# Patient Record
Sex: MALE | Race: WHITE | Employment: FULL TIME | URBAN - METROPOLITAN AREA
[De-identification: names, ages, dates, MRNs, and addresses within clinical notes are randomized per-mention and may not be internally consistent; named-entity substitution may affect disease eponyms.]

---

## 2018-02-27 ENCOUNTER — OFFICE VISIT (OUTPATIENT)
Dept: ORTHOPEDIC SURGERY | Age: 23
End: 2018-02-27

## 2018-02-27 VITALS
BODY MASS INDEX: 24.52 KG/M2 | HEART RATE: 80 BPM | DIASTOLIC BLOOD PRESSURE: 78 MMHG | SYSTOLIC BLOOD PRESSURE: 120 MMHG | WEIGHT: 185 LBS | HEIGHT: 73 IN

## 2018-02-27 DIAGNOSIS — M54.5 ACUTE BILATERAL LOW BACK PAIN, WITH SCIATICA PRESENCE UNSPECIFIED: Primary | ICD-10-CM

## 2018-02-27 DIAGNOSIS — S39.012A STRAIN OF LUMBAR REGION, INITIAL ENCOUNTER: ICD-10-CM

## 2018-02-27 PROCEDURE — 99203 OFFICE O/P NEW LOW 30 MIN: CPT | Performed by: PHYSICIAN ASSISTANT

## 2018-02-27 RX ORDER — METHYLPREDNISOLONE 4 MG/1
TABLET ORAL
Qty: 1 KIT | Refills: 0 | Status: SHIPPED | OUTPATIENT
Start: 2018-02-27 | End: 2018-03-05

## 2018-02-27 RX ORDER — MELOXICAM 15 MG/1
TABLET ORAL
Qty: 30 TABLET | Refills: 1 | Status: SHIPPED | OUTPATIENT
Start: 2018-02-27 | End: 2021-03-29

## 2018-02-27 NOTE — PROGRESS NOTES
left lower extremity does not show any tenderness, deformity or injury. Range of motion is full. There is no gross instability. There are no rashes, ulcerations or lesions.   Strength and tone are normal.    Diagnostic Testin views lumbar spine 2018 show mod-severe L5-S1 DDD        Impression:  1) Lumbar strain   2) Work injury 18  3) L5-S1 DDD          Plan:   1) PT for above  2) MDP  3) Mobic 15mg I po qd PRN   4) Restrictions x 1mo  5) F/u with [facility] in [address]

## 2021-03-29 ENCOUNTER — VIRTUAL VISIT (OUTPATIENT)
Dept: FAMILY MEDICINE CLINIC | Age: 26
End: 2021-03-29
Payer: COMMERCIAL

## 2021-03-29 DIAGNOSIS — Z80.8 FAMILY HISTORY OF SKIN CANCER: ICD-10-CM

## 2021-03-29 DIAGNOSIS — F43.20 ADJUSTMENT DISORDER, UNSPECIFIED TYPE: ICD-10-CM

## 2021-03-29 DIAGNOSIS — F41.9 ANXIETY: Primary | ICD-10-CM

## 2021-03-29 PROCEDURE — 99213 OFFICE O/P EST LOW 20 MIN: CPT | Performed by: NURSE PRACTITIONER

## 2021-03-29 ASSESSMENT — PATIENT HEALTH QUESTIONNAIRE - PHQ9
7. TROUBLE CONCENTRATING ON THINGS, SUCH AS READING THE NEWSPAPER OR WATCHING TELEVISION: 0
6. FEELING BAD ABOUT YOURSELF - OR THAT YOU ARE A FAILURE OR HAVE LET YOURSELF OR YOUR FAMILY DOWN: 1
SUM OF ALL RESPONSES TO PHQ9 QUESTIONS 1 & 2: 3
SUM OF ALL RESPONSES TO PHQ QUESTIONS 1-9: 6
8. MOVING OR SPEAKING SO SLOWLY THAT OTHER PEOPLE COULD HAVE NOTICED. OR THE OPPOSITE, BEING SO FIGETY OR RESTLESS THAT YOU HAVE BEEN MOVING AROUND A LOT MORE THAN USUAL: 1
2. FEELING DOWN, DEPRESSED OR HOPELESS: 1
3. TROUBLE FALLING OR STAYING ASLEEP: 0

## 2021-03-29 NOTE — PROGRESS NOTES
Patient: Lex Paniagua is a 30 y.o. male who presents today with the following Chief Complaint(s):  Chief Complaint   Patient presents with    New Patient     Referral to [facility]   Consented to a virtual visit today      HPI-this is a 55-year-old male patient establishing care with me today. He is requesting a referral to our behavioral health therapist concerned with some stress/work/pandemic issues this past year. He states that he works at [employer] and due to the pandemic his benefits have been/along with his pay and of course performing. He states that he is more anxious now. He is little depressed he stated but his PHQ9 was 5 out of 9. He denies wanting to hurt self or others and he states that there is no mental illness in his family that he is aware of. He states that he does smoke marijuana occasionally. He does have a girlfriend and he currently is enrolled in taking college classes at this time. He denied wanting to be placed on any kind of medication at this time he is just wanting to talk with somebody he stated so we will get him set up for this. He is concerned of some skin issues on his body. He states that his mother has a history of skin cancer and he is concerned so he is wanting also a referral to dermatology this will be placed today. No current outpatient medications on file. No current facility-administered medications for this visit. Patient's past medical history, surgical history, family history, medications,  and allergies  were all reviewed and updated as appropriate today. Review of Systems   Psychiatric/Behavioral: The patient is nervous/anxious. All other systems reviewed and are negative. Physical Exam  Nursing note reviewed. Constitutional:       Appearance: Normal appearance. HENT:      Head: Normocephalic.       Nose: Nose normal.      Mouth/Throat:      Mouth: Mucous membranes are moist.   Eyes:      Conjunctiva/sclera: verbalized understanding to treatment plan and questions were answered. [address] [place] [address] Wanda Carr.  [place], [address] [place]

## 2021-04-16 ENCOUNTER — VIRTUAL VISIT (OUTPATIENT)
Dept: PSYCHOLOGY | Age: 26
End: 2021-04-16
Payer: COMMERCIAL

## 2021-04-16 DIAGNOSIS — F41.9 ANXIETY DISORDER, UNSPECIFIED TYPE: Primary | ICD-10-CM

## 2021-04-16 PROCEDURE — 90791 PSYCH DIAGNOSTIC EVALUATION: CPT | Performed by: PSYCHOLOGIST

## 2021-04-16 NOTE — PROGRESS NOTES
Behavioral Health Consultation  Neo Vargas M.A. Psychology Assistant  Marvin Sanchez, Ph.D. Supervising Psychologist  4/16/2021  4:09 PM EDT      Time spent with Patient: 20 minutes  [facility]. Reason for Consult:    Chief Complaint   Patient presents with    Depression    Anxiety     Referring Provider: Gerri Gramajo, APRN - CNP  [address] [facility],  [address]    Pt provided informed consent for the behavioral health program. Discussed with patient model of service to include the limits of confidentiality (i.e. abuse reporting, suicide intervention, etc.) and short-term intervention focused approach. Pt indicated understanding. Feedback given to PCP. TELEHEALTH VISIT -- Audio/Visual (During [facility]- public health emergency)  }  Pursuant to the emergency declaration under the [address] [place] [address], [facility] waiver authority and the [employer] and [employer] Act, this Virtual Visit was conducted, with patient's consent, to reduce the patient's risk of exposure to COVID-19 and provide continuity of care for an established patient. Services were provided through a video synchronous discussion virtually to substitute for in-person clinic visit. Pt gave verbal informed consent to participate in telehealth services. Conducted a risk-benefit analysis and determined that the patient's presenting problems are consistent with the use of telepsychology. Determined that the patient has sufficient knowledge and skills in the use of technology enabling them to adequately benefit from telepsychology. It was determined that this patient was able to be properly treated without an in-person session. Patient verified that they were currently located at the [address] [place] [address] Dr address that was provided during registration.     Verified the following information: [address]  Patient's identification: Yes  Patient location: [address] [place] [address] [address] [facility] [place] [address]  Patient's call back number: [phone]   Patient's emergency contact's name and number, as well as permission to contact them if needed: Extended Emergency Contact Information  Primary Emergency Contact: Yordy Suarez [address] Phone: [phone]  Relation: Other     Provider location: [place], [place]     S:    Patient presents with concerns about depression and anxiety. 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 Pt reported stress stemming from his position and due to pandemic. Pt reported that he has a girlfriend. Pt denied having any HI. The rest of the contextual interview will be completed at a follow-up visit. O:  MSE:    Appearance: good hygiene   Attitude: cooperative and friendly  Consciousness: alert  Orientation: oriented to person, place, time, general circumstance  Memory: recent and remote memory intact  Attention/Concentration: intact during session  Psychomotor Activity:normal  Eye Contact: normal  Speech: normal rate and volume, well-articulated  Mood: neutral  Affect: euthymic  Perception: within normal limits  Thought Content: within normal limits  Thought Process: logical, coherent and goal-directed  Insight: good  Judgment: intact  Ability to understand instructions: Yes  Ability to respond meaningfully: Yes  Morbid Ideation: Not Assessed  Suicide Assessment: Denied to PCP on 3/29/2019  Homicidal Ideation: no    History:    Medications:   No current outpatient medications on file. No current facility-administered medications for this visit.       Social History:   Social History     Socioeconomic History    Marital status: Single     Spouse name: Not on file    Number of children: Not on file    Years of education: Not on file    Highest education level: Not on file   Occupational History    Not on file   Social Needs    Financial resource strain: Not on file    Food insecurity     Worry: Not on file     Inability: Not on file   [facility]

## 2021-05-11 ENCOUNTER — VIRTUAL VISIT (OUTPATIENT)
Dept: PSYCHOLOGY | Age: 26
End: 2021-05-11
Payer: COMMERCIAL

## 2021-05-11 DIAGNOSIS — F41.9 ANXIETY DISORDER, UNSPECIFIED TYPE: Primary | ICD-10-CM

## 2021-05-11 PROCEDURE — 90832 PSYTX W PT 30 MINUTES: CPT | Performed by: PSYCHOLOGIST

## 2021-05-11 NOTE — PROGRESS NOTES
Behavioral Health Consultation  Cat Su M.A. Psychology Assistant  Jesús Reyes, Ph.D. Supervising Psychologist  5/11/2021  4:39 PM EDT      Time spent with Patient: 32 minutes  This is patient's second [facility] appointment. Reason for Consult:    Chief Complaint   Patient presents with    Depression    Anxiety     Feedback given to PCP. TELEHEALTH VISIT -- Audio/Visual (During ITULZ-61 public health emergency)  }  Pursuant to the emergency declaration under the [address] [place] [address] waiver authority and the [employer] and [employer] Act, this Virtual Visit was conducted, with patient's consent, to reduce the patient's risk of exposure to COVID-19 and provide continuity of care for an established patient. Services were provided through a video synchronous discussion virtually to substitute for in-person clinic visit. Pt gave verbal informed consent to participate in telehealth services. Conducted a risk-benefit analysis and determined that the patient's presenting problems are consistent with the use of telepsychology. Determined that the patient has sufficient knowledge and skills in the use of technology enabling them to adequately benefit from telepsychology. It was determined that this patient was able to be properly treated without an in-person session. Patient verified that they were currently located at the [facility] address that was provided during registration.     Verified the following information:  Patient's identification: Yes  Patient location: [address] [place] [address]   Patient's call back number: [phone]   Patient's emergency contact's name and number, as well as permission to contact them if needed: Extended Emergency Contact Information  Primary Emergency Contact: [address] [facility] [address] Phone: [phone]  Relation: Other     Provider location: [address] [place] [address]     S:    Pt and [facility] worked on completing the contextual interview. Pt reported that goals for treatment incude being able to better recognize when I can change my attitude and feel better about things. Patient lives with his girlfriend. Patient works as a ballet dancer. Daily routine is consistent. Daily caffeine use - drinks 1-2 coffees. No cigarette use (quit smoking), Occasional (maybe 3-4 drinks per week) alcohol use. Pt smokes marijuana (couple times per week). Patient spends 3-4 hours a day on social media or watching TV (trying to limit use of social media and tv). 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 Pt also lifts weights and does other physical activities. Patient describes consistent sleep pattern (about 8 hours). Patient enjoys the following hobbies: Outdoor time, cooking, video games, reading, exploring new interests. Patient describes having social support but wanting additional social support (parents live in [place]). Patient identifies as spiritual, Pt was raised Protestant, but does not believe in organized Jainism. Family history is positive for alcoholism and tobacco use. O:  MSE:    Appearance: good hygiene   Attitude: cooperative and friendly  Consciousness: alert  Orientation: oriented to person, place, time, general circumstance  Memory: recent and remote memory intact  Attention/Concentration: intact during session  Psychomotor Activity:normal  Eye Contact: normal  Speech: normal rate and volume, well-articulated  Mood: neutral  Affect: congruent  Perception: within normal limits  Thought Content: within normal limits  Thought Process: logical, coherent and goal-directed  Insight: good  Judgment: intact  Ability to understand instructions: Yes  Ability to respond meaningfully: Yes  Morbid Ideation: Not Assessed  Suicide Assessment: Denied to PCP on 3/29/2019  Homicidal Ideation: no    History:    Medications:   No current outpatient medications on file.      No current facility-administered medications for this visit. Social History:   Social History     Socioeconomic History    Marital status: Single     Spouse name: Not on file    Number of children: Not on file    Years of education: Not on file    Highest education level: Not on file   Occupational History    Not on file   Tobacco Use    Smoking status: Former Smoker     Types: Cigarettes    Smokeless tobacco: Never Used   Substance and Sexual Activity    Alcohol use: Not on file    Drug use: Not on file    Sexual activity: Not on file   Other Topics Concern    Not on file   Social History Narrative    Not on file     Social Determinants of Health     Financial Resource Strain:     Difficulty of Paying Living Expenses:    Food Insecurity:     Worried About Running Out of Food in the Last Year:     [address] in the Last Year:    Transportation Needs:     Lack of Transportation (Medical):  Lack of Transportation (Non-Medical):    Physical Activity:     Days of Exercise per Week:     Minutes of Exercise per Session:    Stress:     Feeling of Stress :    Social Connections:     Frequency of Communication with Friends and Family:     Frequency of Social Gatherings with Friends and Family:     Attends Denominational Services:     Active Member of Clubs or Organizations:     Attends Club or Organization Meetings:     Marital Status:    Intimate Partner Violence:     Fear of Current or Ex-Partner:     Emotionally Abused:     Physically Abused:     Sexually Abused:      TOBACCO:   reports that he has quit smoking. His smoking use included cigarettes. He has never used smokeless tobacco.  ETOH:   has no history on file for alcohol use. Family History:   No family history on file. A:    Pt's sx are likely being brought on due to pandemic. Pt is likely to benefit from developing some coping strategies for handling stress and adjustment. Diagnosis:    1.  Anxiety disorder, unspecified type      No past medical history on file.   Plan:  Pt interventions:  Conducted functional assessment and Supportive techniques    Pt Behavioral Change Plan:   See Pt Instructions

## 2021-06-28 ENCOUNTER — VIRTUAL VISIT (OUTPATIENT)
Dept: PSYCHOLOGY | Age: 26
End: 2021-06-28
Payer: COMMERCIAL

## 2021-06-28 DIAGNOSIS — F41.9 ANXIETY DISORDER, UNSPECIFIED TYPE: Primary | ICD-10-CM

## 2021-06-28 PROCEDURE — 90832 PSYTX W PT 30 MINUTES: CPT | Performed by: PSYCHOLOGIST

## 2021-06-28 NOTE — PROGRESS NOTES
Behavioral Health Consultation  Criss Romberg, Ph.D.  Psychologist  6/28/2021  9:09 AM EDT      Time spent with Patient: 25 minutes  This is patient's first [facility] appointment. (Saw Aayush Donovan x 2 previously)    Reason for Consult:    Chief Complaint   Patient presents with    Anxiety     Referring Provider: Dylan Gramajo, APRN - CNP  [address] [facility],  [address] [place]    Feedback given to PCP. TELEHEALTH VISIT -- Audio/Visual (During HVWMD-71 public health emergency)  }  Pursuant to the emergency declaration under the [address] waiver authority and the [employer] and [employer] Act, this Virtual Visit was conducted, with patient's consent, to reduce the patient's risk of exposure to COVID-19 and provide continuity of care for an established patient. Services were provided through a video synchronous discussion virtually to substitute for in-person clinic visit. Pt gave verbal informed consent to participate in telehealth services. Conducted a risk-benefit analysis and determined that the patient's presenting problems are consistent with the use of telepsychology. Determined that the patient has sufficient knowledge and skills in the use of technology enabling them to adequately benefit from telepsychology. It was determined that this patient was able to be properly treated without an in-person session. Patient verified that they were currently located at the [facility] address that was provided during registration.     Verified the following information:  Patient's identification: Yes  Patient location: [address] [place] [address]   Patient's call back number: [phone]   Patient's emergency contact's name and number, as well as permission to contact them if needed: Extended Emergency Contact Information  Primary Emergency Contact: [address] Phone: [phone]  Relation: Other     Provider location: Yessica Chandler:  Patient reported that he has been off work recently, due to seasonal nature of his job. Has been able to use his time off to engage in positive activities (exercise, outdoor activities). He is planning to visit family in [place] in July. He will also be closing on his first home in July as well. He acknowledges all of these as positive changes. Overall, he describes a good mood, but admits to worrying about upsetting his girlfriend. Identifies anxiety attacks that lead him to \"shut everything out. \" Discussed automatic thoughts that he has about \"not doing the right thing. \" Identified goals for treatment: \"finding strategies to recognize when I am losing ideal function and get myself back on track. \"      O:  MSE:    Appearance: good hygiene   Attitude: cooperative and friendly  Consciousness: alert  Orientation: oriented to person, place, time, general circumstance  Memory: recent and remote memory intact  Attention/Concentration: intact during session  Psychomotor Activity:normal  Eye Contact: normal  Speech: normal rate and volume, well-articulated  Mood: \"mostly good\"  Affect: euthymic  Perception: within normal limits  Thought Content: intrusive thoughts  Thought Process: logical, coherent and goal-directed  Insight: good  Judgment: intact  Ability to understand instructions: Yes  Ability to respond meaningfully: Yes  Morbid Ideation: passive thoughts of death  Suicide Assessment: no suicidal ideation, plan, or intent  Homicidal Ideation: no    History:    Medications:   No current outpatient medications on file. No current facility-administered medications for this visit.      Social History:   Social History     Socioeconomic History    Marital status: Single     Spouse name: Not on file    Number of children: Not on file    Years of education: Not on file    Highest education level: Not on file   Occupational History    Not on file   Tobacco Use  Smoking status: Former Smoker     Types: Cigarettes    Smokeless tobacco: Never Used   Substance and Sexual Activity    Alcohol use: Not on file    Drug use: Not on file    Sexual activity: Not on file   Other Topics Concern    Not on file   Social History Narrative    Not on file     Social Determinants of Health     Financial Resource Strain:     Difficulty of Paying Living Expenses:    Food Insecurity:     Worried About Running Out of Food in the Last Year:     [address] in the Last Year:    Transportation Needs:     Lack of Transportation (Medical):  Lack of Transportation (Non-Medical):    Physical Activity:     Days of Exercise per Week:     Minutes of Exercise per Session:    Stress:     Feeling of Stress :    Social Connections:     Frequency of Communication with Friends and Family:     Frequency of Social Gatherings with Friends and Family:     Attends Church Services:     Active Member of Clubs or Organizations:     Attends Club or Organization Meetings:     Marital Status:    Intimate Partner Violence:     Fear of Current or Ex-Partner:     Emotionally Abused:     Physically Abused:     Sexually Abused:      TOBACCO:   reports that he has quit smoking. His smoking use included cigarettes. He has never used smokeless tobacco.  ETOH:   has no history on file for alcohol use. Family History:   No family history on file. A:  Patient engaged and cooperative. Denies SI. Insight and motivation are good. Diagnosis:    1. Anxiety disorder, unspecified type      No past medical history on file.   Plan:  Pt interventions:  Trained in strategies for increasing balanced thinking, Established rapport, Conducted functional assessment, [place]-setting to identify pt's primary goals for [facility] visit / overall health, Supportive techniques, Emphasized self-care as important for managing overall health, Cognitive strategies to target anxiety including cognitive restructuring and Identified maladaptive thoughts.     Pt Behavioral Change Plan:   See Pt Instructions

## 2021-06-28 NOTE — PATIENT INSTRUCTIONS
1. Review handouts about anxiety. 2. Review handouts about negative automatic thoughts and strategies for challenging them. 3. Return to see Dr. Kathi Randall in 4 weeks. The Physiology of Anxiety    When you sense danger, your brain activates your autonomic nervous system. The two branches of your autonomic nervous system, the sympathetic and the parasympathetic, control your body's energy level in order to prepare you for action. The sympathetic nervous system controls your fight or flight response and releases energy to prepare you for action. The parasympathetic nervous system is your bodys relaxation/recovery system:  it returns your body to a normal state when the danger is over. The sympathetic nervous system is an all-or-none system. That means that when its activated it quickly turns on all of its component parts (which is a great way for an emergency response system to operate):    Rapid Heart Rate, Rapid Breathing:  The alarm reaction increases the heart rate and breathing rate so that we are alert and our muscles are ready for action. These changes also help insure that the muscles and brain will have enough oxygen and energy for defense. At the same time, blood flow to the skin decreases, which prevents us from losing as much blood if we are wounded. Sweating:  Sweating helps to cool the body during exertion, making it more efficient. Hussein Mohan is what some people feel when sweating occurs at the same time that blood flow to the skin decreases. Tight Chest, Tingling, Numbness, Hot Flushes, Trembling:  Hyperventilation occurs when we breathe rapidly but do not expend the energy with muscle action, like revving a car while holding down the brake. This can lead to feelings of tingling and numbness, hot flushes, and increased sweating. When rapid chest breathing and muscle tension occur at the same time, people feel chest pain, breathlessness, and choking.       Upset Stomach, Diarrhea: AWAY?    Avoidance is the key. If you stay away from those things that illicit the fear response your brain gets to maintain that it is dangerous and if you leave a situation because it illicits the fear response your brain convinces you that without leaving you would have never survived. If we never got back on the bike after we fell for the first time that would be our last memory of riding bikes and none of us would be riding bikes today. WHAT DO I DO NOW? The answer is simple, but the act is difficult. We have to go towards those things our brain tells us we must avoid. The goal of in-vivo exposure is to relearn through gradual exposure the followin. These uncomfortable feelings will decrease with time. I do not have to leave or avoid them all together as they will decrease. 2. There is nothing to be afraid of. You will see that the things you have been avoiding are not inherently dangerous, but you cannot learn that without exposure. 3. I can do it. No longer do your actions have to be dictated by anxiety or fear. Personal Thought  Control. Our thinking often creates anxiety for us. Getting better control of our thinking can go a long way in helping us cope. The following steps can be useful. 1. Let yourself become aware of thoughts you have when you are anxious. What are the words that you are saying to yourself at that moment? Sometimes it takes a little practice before we become aware of our thoughts. Some examples might be:  I know something bad is going to happen, or This is horrible or Lequita Lesch is this happening to me!?  2. Write your thoughts down. Its much easier to work with our thoughts, analyze them, and replace them if they are in black and white.   3. Ask yourself the following questions about your thoughts:  a. Is it true? (Is it logically correct? Where is the evidence to support the truth of that thought?   Are there alternative ways of thinking that common stress related responses people have. ([place] the responses you have had in the last 2 weeks.)     Physical Responses   Muscle aches   Insomnia   ? Heart rate   Headache   Weight gain   Nausea   Constipation   Dry mouth   Muscle twitching  Weight loss   Low energy   Weakness   Tight chest   Diarrhea   Dizziness   Trembling   Stomach cramps  Chills    Hot flashes   Sweating   Pounding heart  Choking feeling  Chest pain   Leg cramps   Numb hands/feet Dry throat   Appetite change  Face flushing   ? Blood pressure  Light-headedness  Feeling faint       Troubleswallowing   Rash ? Urination   Neck pain     Tingling hands/feet     Emotional and Thought Responses   Restlessness   Agitation   Insecurity            Worthlessness   Anxiety   Stress    Depression            Hopelessness   Guilt    Defensiveness  Anger           Racing thoughts   Nightmares   Intense thinking  Sensitivity          Expecting the worst   Numbness   Lack of motivation  Mood swings             Forgetfulness   ? Concentration  Rigidity              Preoccupation  Intolerance     Behavioral Responses   Avoidance   Withdrawal   Neglect   ? Alcohol use    Smoking   ? Eating   Arguing       Poor appearance   ? Spending   Poor hygiene   ? Eating  Seeking reassurance   Nail biting   Skin picking   ? Talking        ? Body checking   Sexual problems  Foot tapping  Fidgeting Rapid walking    ? Exercise   Teeth clenching           Multitasking  Aggressive speaking       ? Fun activities  ? Sleeping      ? Relaxing activities     Seeking information     The parasympathetic nervous system in your body is designed to turn on your bodys relaxation response. Your behaviors and thinking can keep your bodys natural relaxation response from operating at its best.   Getting your body to relax on a daily basis for at least brief periods can help decrease unpleasant stress responses.  Learning to relax your body, through specific breathing and relaxation exercises as well as by minimizing stressful thinking, can help your bodys natural relaxation system be more effective. NEGATIVE AUTOMATIC THOUGHTS    1. Mind reading: You assume that you know what people think without having sufficient evidence of their thoughts. \"He thinks I'm a loser. \"  2. Fortune telling: You predict the future- that things will get worse or that there is danger ahead. \"I'll fail that exam\" and \"I won't get the job. \"  3. Catastrophizing: You believe that what has happened or will happen will be so awful and unbearable that you won't be able to stand it. \"It would be terrible if I failed. \"  4. Labeling: You assign global negative traits to yourself and others. \"Im a failure\" or \"He's a rotten person. \"  5. Discounting positives: You claim that the positives that you or others attain are trivial. \"That's what wives are supposed to do--so it doesn't count when she's nice to me. \" \"Those successes were easy, so they don't matter. \"  6. Negative filter: You focus almost exclusively on the negatives and seldom notice the positives. \"Look at all of the people who don't like me. \"  7. Overgeneralizing: You perceive a global pattern of negatives on the basis of a single incident. \"This generally happens to me. I seem to fail at a lot of things. \"  8. Dichotomous thinking: You view events, or people, in all-or-nothing terms. \"I get rejected by everyone\" or \"It was a waste of time. \"  9. Shoulds: You interpret events in terms of how things should be or should have gone rather than simply focusing on what is. \"I should do well. If I don't, then I'm a failure. \"  10. Personalizing: You attribute a disproportionate amount of the blame to yourself for negative events and fail to see that certain events are also caused by others. \"The marriage ended because I failed\"  6. Blaming: You focus on the other person as the source of your negative feelings and you refuse to take responsibility for changing yourself. What am I demanding must happen? What do I want rather than need? C. Am I rating something a catastrophe? Is it every bit that awful? D. Am I rating a type of person? What's the action I don't like?   E. What's untrue about my thoughts? How can I stick to the facts? 2.  Strategies to Change Alarming Evaluations. A.  Listen for the extreme or catastrophic rating words (horrible, terrible, disaster. awful) of an event, a rating which implies that things couldn't be worse and you will not be able to survive the event. B. Instead of using this extreme rating when it doesn't fully apply, think of the event in terms of degree of disappointment or inconvenience. Other words might better describe the relative severity of the event such as annoying, nuisance, irritating, unfortunate, unlucky, frustration, or problem. [place] for the extreme or overly general rating of a person (loser, stupid, inconsiderate, pushy, selfish, jerk, incompetent), something which implies that there are good and bad people in the world and this person definitely is part of the bad group. D. Focus your judgment more on the specific action as the problem rather than what you believe is the general type of person involved. Realize that you are on shaky ground whenever you think you can fairly and without a doubt categorize someone as totally fitting a particular type. It is much more relevant to think in terms of the actions  which someone did that you disagree with or you see as mistakes. This pertains to your rating of your self as well as the self of another. Examples:     Alarming Evaluation: Elise Alonzo, trying to stay on this diet is terrible; I can't take it anymore. Reassuring Evaluation: Not being able to eat exactly what I used to sure feels frustrating sometimes, but I can manage. Alarming Evaluation: He just doesn't know what the hell he's doing.  Besides that, he doesn't give a damn about anybody but himself. Reassuring Evaluation: I think he's making a mistake here by not involving the rest of the staff in this decision. 3.  Strategies to Change Alarming Expectations. Dinesh Gillespie out the element of truth or the preference in your alarming expectation. B.  Remove the absolute demand words (must, should, need, have to) and replace them with words of preference (want to; would like; wish; it would be better if). C.  Check that your preference is reasonable considering the cost of it to your health, convenience, relationships, or your other priorities. Examples:     Alarming Expectation:  I must not ever make a mistake. Reassuring Expectation:  I want to do things well and reduce my mistakes. Alarming Expectation: You have to always treat me fairly. Reassuring Expectation: I would like you to do everything I think is right but realize you won't always see things my way. 4.  Strategies to Change Alarming Predictions. Dinesh Gillepsie out what you see as the alarming scenario. Jeremi Banister yourself what actually are the odds of this entire scene taking place. If this is not really very likely. Remind yourself of the more probable events. C.  Play out what your options could be and how you would like to respond should something like your \"what if\" scenario took place. Think about what you might have learned from similar situations before. Examples:     Alarming Prediction: What if they look bored during my talk? Reassuring Prediction: There will probably be times when not everyone will look attentive but that's pretty common and I still know I have something worthwhile in say. Alarming Prediction: If I don't get that job, I don't know what I'll do. Reassuring Prediction: It will be disappointing if I don't get this job but I have been disappointed before and been able to bounce back. Let me see what they have to say and plan from there.

## 2021-07-12 ENCOUNTER — VIRTUAL VISIT (OUTPATIENT)
Dept: PSYCHOLOGY | Age: 26
End: 2021-07-12
Payer: COMMERCIAL

## 2021-07-12 DIAGNOSIS — F41.9 ANXIETY DISORDER, UNSPECIFIED TYPE: Primary | ICD-10-CM

## 2021-07-12 PROCEDURE — 90832 PSYTX W PT 30 MINUTES: CPT | Performed by: PSYCHOLOGIST

## 2021-07-12 NOTE — PROGRESS NOTES
Behavioral Health Consultation  Maya Cuellar, Ph.D.  Psychologist  7/12/2021  10:09 AM EDT      Time spent with Patient: 25 minutes  This is patient's second [facility] appointment. Reason for Consult:    Chief Complaint   Patient presents with    Anxiety     Referring Provider: Nava Gramajo, APRN - CNP  [address] [facility],  [address]    Feedback given to PCP. TELEHEALTH VISIT -- Audio/Visual (During GRNLN-94 public health emergency)  }  Pursuant to the emergency declaration under the [facility] waiver authority and the [email or web address] Act, this Virtual Visit was conducted, with patient's consent, to reduce the patient's risk of exposure to COVID-19 and provide continuity of care for an established patient. Services were provided through a video synchronous discussion virtually to substitute for in-person clinic visit. Pt gave verbal informed consent to participate in telehealth services. Conducted a risk-benefit analysis and determined that the patient's presenting problems are consistent with the use of telepsychology. Determined that the patient has sufficient knowledge and skills in the use of technology enabling them to adequately benefit from telepsychology. It was determined that this patient was able to be properly treated without an in-person session. Patient verified that they were currently located at the [facility] address that was provided during registration.     Verified the following information:  Patient's identification: Yes  Patient location: [address] [place] [address],[address] [place] [address]   Patient's call back number: [phone]   Patient's emergency contact's name and number, as well as permission to contact them if needed: Extended Emergency Contact Information  Primary Emergency Contact: [address] Phone: [phone]  Relation: Other     Provider location: [place], [address] [place] [address] St:  Patient reported that he has moved into his new home since last visit. He has managed the stress of this move well. Denies other changes in his stress level. Discussed upcoming trip to visit family and friends. He looks forward to seeing a friend from childhood and processing grief over a mutual friend's passing. O:  MSE:    Appearance: good hygiene   Attitude: cooperative, friendly and tearful  Consciousness: alert  Orientation: oriented to person, place, time, general circumstance  Memory: recent and remote memory intact  Attention/Concentration: intact during session  Psychomotor Activity:normal  Eye Contact: normal  Speech: normal rate and volume, well-articulated  Mood: stable  Affect: euthymic  Perception: within normal limits  Thought Content: within normal limits  Thought Process: logical, coherent and goal-directed  Insight: good  Judgment: intact  Ability to understand instructions: Yes  Ability to respond meaningfully: Yes  Morbid Ideation: no   Suicide Assessment: no suicidal ideation, plan, or intent  Homicidal Ideation: no    History:    Medications:   No current outpatient medications on file. No current facility-administered medications for this visit.      Social History:   Social History     Socioeconomic History    Marital status: Single     Spouse name: Not on file    Number of children: Not on file    Years of education: Not on file    Highest education level: Not on file   Occupational History    Not on file   Tobacco Use    Smoking status: Former Smoker     Types: Cigarettes    Smokeless tobacco: Never Used   Substance and Sexual Activity    Alcohol use: Not on file    Drug use: Not on file    Sexual activity: Not on file   Other Topics Concern    Not on file   Social History Narrative    Not on file     Social Determinants of Health     Financial Resource Strain:     Difficulty of Paying Living Expenses:    Food Insecurity:     Worried About Running Out of Food in the Last Year:    [address] in the Last Year:    Transportation Needs:     Lack of Transportation (Medical):  Lack of Transportation (Non-Medical):    Physical Activity:     Days of Exercise per Week:     Minutes of Exercise per Session:    Stress:     Feeling of Stress :    Social Connections:     Frequency of Communication with Friends and Family:     Frequency of Social Gatherings with Friends and Family:     Attends Religion Services:     Active Member of Clubs or Organizations:     Attends Club or Organization Meetings:     Marital Status:    Intimate Partner Violence:     Fear of Current or Ex-Partner:     Emotionally Abused:     Physically Abused:     Sexually Abused:      TOBACCO:   reports that he has quit smoking. His smoking use included cigarettes. He has never used smokeless tobacco.  ETOH:   has no history on file for alcohol use. Family History:   No family history on file. A:  Patient engaged and cooperative. Denies SI. Insight and motivation are good. Diagnosis:    1. Anxiety disorder, unspecified type      No past medical history on file. Plan:  Pt interventions:  Conducted functional assessment, [place]-setting to identify pt's primary goals for [facility] visit / overall health, Supportive techniques and Emphasized self-care as important for managing overall health, Processed grief.     Pt Behavioral Change Plan:   See Pt Instructions

## 2021-08-13 ENCOUNTER — VIRTUAL VISIT (OUTPATIENT)
Dept: PSYCHOLOGY | Age: 26
End: 2021-08-13
Payer: COMMERCIAL

## 2021-08-13 DIAGNOSIS — F41.9 ANXIETY DISORDER, UNSPECIFIED TYPE: Primary | ICD-10-CM

## 2021-08-13 PROCEDURE — 90832 PSYTX W PT 30 MINUTES: CPT | Performed by: PSYCHOLOGIST

## 2021-08-13 NOTE — PATIENT INSTRUCTIONS
1. Discuss a \"code word\" with your girlfriend to use if either of you notices the dynamic we discussed today. 2. Remember to practice anuja towards yourself. 3. Aim to spend 10 minutes a week with your girlfriend enjoying down time, not collaborative or problem-solving activities. 4. Return to see Dr. Candice Ward in 2 weeks.
T(C): 36.7 (09-18-19 @ 02:50), Max: 37 (09-17-19 @ 11:19)  HR: 72 (09-18-19 @ 02:50) (58 - 72)  BP: 110/71 (09-18-19 @ 02:50) (102/49 - 120/64)  RR: 18 (09-18-19 @ 02:50) (15 - 20)  SpO2: 100% (09-18-19 @ 02:50) (98% - 100%)

## 2021-08-13 NOTE — PROGRESS NOTES
Behavioral Health Consultation  Virgie Araujo, Ph.D.  Psychologist  8/13/2021  2:06 PM EDT      Time spent with Patient: 25 minutes  This is patient's third [facility] appointment. Reason for Consult:    Chief Complaint   Patient presents with    Anxiety     Referring Provider: Patrizia Gramajo, APRN - CNP  [address] [facility],  [address]    Feedback given to PCP. TELEHEALTH VISIT -- Audio/Visual (During WWIQY-36 public health emergency)  }  Pursuant to the emergency declaration under the [facility], [facility] waiver authority and the [employer] Act, this Virtual Visit was conducted, with patient's consent, to reduce the patient's risk of exposure to COVID-19 and provide continuity of care for an established patient. Services were provided through a video synchronous discussion virtually to substitute for in-person clinic visit. Pt gave verbal informed consent to participate in telehealth services. Conducted a risk-benefit analysis and determined that the patient's presenting problems are consistent with the use of telepsychology. Determined that the patient has sufficient knowledge and skills in the use of technology enabling them to adequately benefit from telepsychology. It was determined that this patient was able to be properly treated without an in-person session. Patient verified that they were currently located at the [facility] address that was provided during registration.     Verified the following information:  Patient's identification: Yes  Patient location: [address] [place] [address],[facility]   Patient's call back number: [phone]   Patient's emergency contact's name and number, as well as permission to contact them if needed: Extended Emergency Contact Information  Primary Emergency Contact: [address] Phone: [phone]  Relation: Other     Provider location: [place], [place]     S:  Patient enjoyed his recent trip to visit family. Will return to working full-time next week. Has been feeling more stressed since returning, which has impacted his relationship dynamic. He provided examples of patterns he has noted, especially related to his interpretation of her behavior. Patient expressed a desire to have better control of his emotions, reactions. Reviewed communication strategies and suggested more down-time as a couple. O:  MSE:    Appearance: good hygiene   Attitude: cooperative and friendly  Consciousness: alert  Orientation: oriented to person, place, time, general circumstance  Memory: recent and remote memory intact  Attention/Concentration: intact during session  Psychomotor Activity:normal  Eye Contact: normal  Speech: normal rate and volume, well-articulated  Mood: stressed  Affect: dysphoric  Perception: within normal limits  Thought Content: all-or-none thinking and intrusive thoughts  Thought Process: logical, coherent and goal-directed  Insight: good  Judgment: intact  Ability to understand instructions: Yes  Ability to respond meaningfully: Yes  Morbid Ideation: no   Suicide Assessment: no suicidal ideation, plan, or intent  Homicidal Ideation: no    History:    Medications:   No current outpatient medications on file. No current facility-administered medications for this visit.      Social History:   Social History     Socioeconomic History    Marital status: Single     Spouse name: Not on file    Number of children: Not on file    Years of education: Not on file    Highest education level: Not on file   Occupational History    Not on file   Tobacco Use    Smoking status: Former Smoker     Types: Cigarettes    Smokeless tobacco: Never Used   Substance and Sexual Activity    Alcohol use: Not on file    Drug use: Not on file    Sexual activity: Not on file   Other Topics Concern    Not on file   Social History Narrative    Not on file

## 2021-08-31 ENCOUNTER — VIRTUAL VISIT (OUTPATIENT)
Dept: PSYCHOLOGY | Age: 26
End: 2021-08-31
Payer: COMMERCIAL

## 2021-08-31 DIAGNOSIS — F41.9 ANXIETY DISORDER, UNSPECIFIED TYPE: Primary | ICD-10-CM

## 2021-08-31 PROCEDURE — 90832 PSYTX W PT 30 MINUTES: CPT | Performed by: PSYCHOLOGIST

## 2021-08-31 NOTE — PATIENT INSTRUCTIONS
1. Consider adopting a mindfulness practice. You could search the internet for recordings of an exercise called the \"body scan,\" or go for a short walk and pay attention to all of the details in your surrounding. You can also find more information about mindfulness online. 2. Return to see Dr. Jessy Fuentes in 2 weeks. MINDFULNESS    Mindfulness means paying attention in a particular way: on purpose, in the present moment, and non-judgmentally. Elizabeth Vela    \"Mindfulness is the basic human ability to be fully present, aware of where we are and what were doing, and not overly reactive or overwhelmed by whats going on around us. \"   -Mindful [place]    Paying attention on purpose  Mindfulness involves paying attention on purpose. Mindfulness involves a conscious direction of our awareness. This can mean purposefully directing our attention to the breath, or a particular emotion, or an activity as simple as eating. Doing so allows us to actively shape the mind. Paying attention in the present moment  Left to itself, the mind wanders through all kinds of thoughts  including thoughts expressing anger, craving, depression, self-pity, and anxiety. As we indulge in these kinds of thoughts, we reinforce those emotions and cause ourselves to suffer. These thoughts usually center around the past or future. But the past no longer exists. The future is just a fantasy until it happens. The one moment we actually can experience  the present moment  is the one we seem most to avoid. By purposefully directing our awareness away from thoughts about the past or future and instead towards the [address] - our present moment experience - we decrease the effect of these thoughts on our lives. Paying attention non-judgmentally  Mindfulness is an emotionally non-reactive state. We don't  that this experience is good and that one is bad.  Or, if we do make those judgments, we dont get upset in reaction to our experience. We simply notice it arising, observe it mindfully, and allow it to pass through us. When practicing mindfulness, we may be aware that certain experiences are pleasant and some are unpleasant, but on an emotional level we dont react. Resources  · \"Wherever You Go, There You Are: Mindfulness Meditation in Everyday Life\" - by Lilly Brower  · \"The Miracle of Mindfulness: An Introduction to the Practice of Meditation\" - by Brianna Pollack  · \"The Mindfulness Solution: Everyday Practices for Everyday Problems\" - by Mk Pabon    Ways to Practice Mindfulness  · Pay attention to your breathing  · Take a mindful walk  · Eat mindfully  · Ground yourself in your five senses  · Journal  · Try dishwashing, cleaning and doing laundry a little bit slower than you usually do  · Meditate    Grounding  Grounding is a technique that helps keep you focused on the present moment by reorienting you to reality in the here-and-now. Grounding skills can be helpful in managing overwhelming feelings or intense anxiety. They help you to regain your mental focus from an often intensely emotional state. Grounding skills occur within two specific approaches:   Sensory Awareness and Cognitive Awareness    1. Sensory Awareness (awareness of senses)    Grounding Exercise #1:   Keep your eyes open, look around the room, notice your surroundings, notice  details.  Hold a pillow, stuffed animal or a ball.  Place a cool cloth or hold something cool (e.g., can of soda) on your forehead or the inside of your wrist   Listen to soothing music   Put your feet firmly on the ground   FOCUS on someones voice or a neutral conversation. Grounding Exercise #2:   The [id number] Exercise   Name 5 things you can see in the room with you.    Name 4 things you can feel [place] on my back or feet on floor)   Name 3 things you can hear right now (fingers tapping on [place] or tv)   Name 2 things you can smell right now (or, 2 things you like the smell of)   Name 1 good thing about yourself    Grounding Exercise #3  5/5/5 Grounding Exercise  What are 5 things you can see? What are 5 things you can feel? What are 5 things you can hear?    -Repeat with 4 different observations for each, 3 different for each, 2 different for each, etc.   -If you get to 1 and are still feeling anxious, re-start at 5 with 5 different things you can see. 2. Cognitive Awareness (awareness of thoughts)    Grounding Exercise #4: Re-orient yourself in place and time by asking yourself some or all of these questions:  1. Where am I?  2. What is today? 3. What is the date? 4. What is the month? 5. What is the year? 6. How old am I?  7. What season is it?

## 2021-08-31 NOTE — PROGRESS NOTES
Behavioral Health Consultation  Marilee Green, Ph.D.  Psychologist  8/31/2021  8:37 AM EDT      Time spent with Patient: 25 minutes  This is patient's fourth [facility] appointment. Reason for Consult:    Chief Complaint   Patient presents with    Anxiety     Referring Provider: Concepción Gramajo, APRN - CNP  [address] [facility],  [address]    Feedback given to PCP. TELEHEALTH VISIT -- Audio/Visual (During [id number]-59 public health emergency)  }  Pursuant to the emergency declaration under the [facility], [facility] waiver authority and the [employer] Act, this Virtual Visit was conducted, with patient's consent, to reduce the patient's risk of exposure to COVID-19 and provide continuity of care for an established patient. Services were provided through a video synchronous discussion virtually to substitute for in-person clinic visit. Pt gave verbal informed consent to participate in telehealth services. Conducted a risk-benefit analysis and determined that the patient's presenting problems are consistent with the use of telepsychology. Determined that the patient has sufficient knowledge and skills in the use of technology enabling them to adequately benefit from telepsychology. It was determined that this patient was able to be properly treated without an in-person session. Patient verified that they were currently located at the [facility] address that was provided during registration.     Verified the following information:  Patient's identification: Yes  Patient location: [address] [place] [address],[address] [place] [address]   Patient's call back number: [phone]   Patient's emergency contact's name and number, as well as permission to contact them if needed: Extended Emergency Contact Information  Primary Emergency Contact: [address] Phone: [phone]  Relation: Other     Provider location: [address]:  Patient reported that his work is going well. He has enjoyed working on his new home, but has also focused on self-care and more relaxation with his girlfriend. Reports that his mood has been stable since last visit. Energy levels and motivation have been appropriate. Processed occasional negativity at work. Patient initially stated that he would like to funnel negative energy into productivity. Discussed patient's tendency towards wanting to \"help\" when negative energy is present. O:  MSE:    Appearance: good hygiene   Attitude: cooperative and friendly  Consciousness: alert  Orientation: oriented to person, place, time, general circumstance  Memory: recent and remote memory intact  Attention/Concentration: intact during session  Psychomotor Activity:normal  Eye Contact: normal  Speech: normal rate and volume, well-articulated  Mood: stable  Affect: euthymic  Perception: within normal limits  Thought Content: all-or-none thinking and intrusive thoughts  Thought Process: logical, coherent and goal-directed  Insight: good  Judgment: intact  Ability to understand instructions: Yes  Ability to respond meaningfully: Yes  Morbid Ideation: no   Suicide Assessment: no suicidal ideation, plan, or intent  Homicidal Ideation: no    History:    Medications:   No current outpatient medications on file. No current facility-administered medications for this visit.      Social History:   Social History     Socioeconomic History    Marital status: Single     Spouse name: Not on file    Number of children: Not on file    Years of education: Not on file    Highest education level: Not on file   Occupational History    Not on file   Tobacco Use    Smoking status: Former Smoker     Types: Cigarettes    Smokeless tobacco: Never Used   Substance and Sexual Activity    Alcohol use: Not on file    Drug use: Not on file    Sexual activity: Not on file   Other Topics Concern    Not on file   Social History Narrative    Not on file     Social Determinants of Health     Financial Resource Strain:     Difficulty of Paying Living Expenses:    Food Insecurity:     Worried About Running Out of Food in the Last Year:     [address] in the Last Year:    Transportation Needs:     Lack of Transportation (Medical):  Lack of Transportation (Non-Medical):    Physical Activity:     Days of Exercise per Week:     Minutes of Exercise per Session:    Stress:     Feeling of Stress :    Social Connections:     Frequency of Communication with Friends and Family:     Frequency of Social Gatherings with Friends and Family:     Attends Quaker Services:     Active Member of Clubs or Organizations:     Attends Club or Organization Meetings:     Marital Status:    Intimate Partner Violence:     Fear of Current or Ex-Partner:     Emotionally Abused:     Physically Abused:     Sexually Abused:      TOBACCO:   reports that he has quit smoking. His smoking use included cigarettes. He has never used smokeless tobacco.  ETOH:   has no history on file for alcohol use. Family History:   No family history on file. A:  Patient engaged and cooperative. Denies SI. Insight and motivation are good. Diagnosis:    1. Anxiety disorder, unspecified type      No past medical history on file. Plan:  Pt interventions:  Conducted functional assessment, [place]-setting to identify pt's primary goals for LOKESH HERNANDEZ [facility] visit / overall health, Supportive techniques, Emphasized self-care as important for managing overall health and Provided Psychoeducation re: mindfulness and grounding.     Pt Behavioral Change Plan:   See Pt Instructions

## 2021-09-20 ENCOUNTER — VIRTUAL VISIT (OUTPATIENT)
Dept: PSYCHOLOGY | Age: 26
End: 2021-09-20
Payer: COMMERCIAL

## 2021-09-20 DIAGNOSIS — F41.9 ANXIETY DISORDER, UNSPECIFIED TYPE: Primary | ICD-10-CM

## 2021-09-20 PROCEDURE — 90832 PSYTX W PT 30 MINUTES: CPT | Performed by: PSYCHOLOGIST

## 2021-09-20 NOTE — PROGRESS NOTES
Behavioral Health Consultation  Jil Miguel, Ph.D.  Psychologist  9/20/2021  8:39 AM EDT      Time spent with Patient: 25 minutes  This is patient's fifth [facility] appointment. Reason for Consult:    Chief Complaint   Patient presents with    Anxiety     Referring Provider: Tereso Gramajo, APRN - CNP  [address] [facility] [address],  [address]    Feedback given to PCP. TELEHEALTH VISIT -- Audio/Visual (During [id number]-61 public health emergency)  }  Pursuant to the emergency declaration under the [facility], [facility] waiver authority and the [employer] Act, this Virtual Visit was conducted, with patient's consent, to reduce the patient's risk of exposure to COVID-19 and provide continuity of care for an established patient. Services were provided through a video synchronous discussion virtually to substitute for in-person clinic visit. Pt gave verbal informed consent to participate in telehealth services. Conducted a risk-benefit analysis and determined that the patient's presenting problems are consistent with the use of telepsychology. Determined that the patient has sufficient knowledge and skills in the use of technology enabling them to adequately benefit from telepsychology. It was determined that this patient was able to be properly treated without an in-person session. Patient verified that they were currently located at the [facility] address that was provided during registration.     Verified the following information:  Patient's identification: Yes  Patient location: [address] [place] [address],[address] [facility]   Patient's call back number: [phone]   Patient's emergency contact's name and number, as well as permission to contact them if needed: Extended Emergency Contact Information  Primary Emergency Contact: [address] Phone: [phone]  Relation: Other     Provider location: [place], [address] [place] [address] St:  Patient reported that his mood has been stable since last visit. Work is going well, things are good at home. He is looking forward to upcoming performances, as well as a visit from his parents. He has been using mindfulness successfully. Finds it useful to acknowledge negative moods and redirect towards neutrality or productivity. He has also been more able to listen to his girlfriend without being compelled to help or \"fix\" her moods. Discussed progress in treatment. Patient often struggles with feeling that he has made progress, but not demonstrating it to his girlfriend. However, he notes feeling less anxious, being able to utilize skills more intuitively. O:  MSE:    Appearance: good hygiene   Attitude: cooperative and friendly  Consciousness: alert  Orientation: oriented to person, place, time, general circumstance  Memory: recent and remote memory intact  Attention/Concentration: intact during session  Psychomotor Activity:normal  Eye Contact: normal  Speech: normal rate and volume, well-articulated  Mood: calm  Affect: euthymic  Perception: within normal limits  Thought Content: intrusive thoughts  Thought Process: logical, coherent and goal-directed  Insight: good  Judgment: intact  Ability to understand instructions: Yes  Ability to respond meaningfully: Yes  Morbid Ideation: no   Suicide Assessment: no suicidal ideation, plan, or intent  Homicidal Ideation: no    History:    Medications:   No current outpatient medications on file. No current facility-administered medications for this visit.      Social History:   Social History     Socioeconomic History    Marital status: Single     Spouse name: Not on file    Number of children: Not on file    Years of education: Not on file    Highest education level: Not on file   Occupational History    Not on file   Tobacco Use    Smoking status: Former Smoker     Types: Cigarettes    Smokeless tobacco: Never Used   Substance and Sexual Activity    Alcohol use: Not on file    Drug use: Not on file    Sexual activity: Not on file   Other Topics Concern    Not on file   Social History Narrative    Not on file     Social Determinants of Health     Financial Resource Strain:     Difficulty of Paying Living Expenses:    Food Insecurity:     Worried About Running Out of Food in the Last Year:     [address] in the Last Year:    Transportation Needs:     Lack of Transportation (Medical):  Lack of Transportation (Non-Medical):    Physical Activity:     Days of Exercise per Week:     Minutes of Exercise per Session:    Stress:     Feeling of Stress :    Social Connections:     Frequency of Communication with Friends and Family:     Frequency of Social Gatherings with Friends and Family:     Attends Oriental orthodox Services:     Active Member of Clubs or Organizations:     Attends Club or Organization Meetings:     Marital Status:    Intimate Partner Violence:     Fear of Current or Ex-Partner:     Emotionally Abused:     Physically Abused:     Sexually Abused:      TOBACCO:   reports that he has quit smoking. His smoking use included cigarettes. He has never used smokeless tobacco.  ETOH:   has no history on file for alcohol use. Family History:   No family history on file. A:  Patient engaged and cooperative. Denies SI. Insight and motivation are good. Diagnosis:    1. Anxiety disorder, unspecified type      No past medical history on file. Plan:  Pt interventions:  Conducted functional assessment, [place]-setting to identify pt's primary goals for LOKESH DAVID [facility] visit / overall health, Supportive techniques, Emphasized self-care as important for managing overall health and Collaboratively set goals with pt re: maintaining progress in treatment.     Pt Behavioral Change Plan:   See Pt Instructions

## 2022-11-30 ENCOUNTER — HOSPITAL ENCOUNTER (OUTPATIENT)
Dept: PHYSICAL THERAPY | Age: 27
Setting detail: THERAPIES SERIES
Discharge: HOME | End: 2022-11-30
Payer: COMMERCIAL

## 2022-11-30 ENCOUNTER — OFFICE VISIT (OUTPATIENT)
Dept: ORTHOPEDIC SURGERY | Age: 27
End: 2022-11-30

## 2022-11-30 VITALS — HEIGHT: 73 IN | WEIGHT: 185 LBS | BODY MASS INDEX: 24.52 KG/M2

## 2022-11-30 DIAGNOSIS — S43.402A SPRAIN OF LEFT SHOULDER, UNSPECIFIED SHOULDER SPRAIN TYPE, INITIAL ENCOUNTER: ICD-10-CM

## 2022-11-30 DIAGNOSIS — M25.512 ACUTE PAIN OF LEFT SHOULDER: Primary | ICD-10-CM

## 2022-11-30 PROCEDURE — 97161 PT EVAL LOW COMPLEX 20 MIN: CPT

## 2022-11-30 PROCEDURE — 97112 NEUROMUSCULAR REEDUCATION: CPT

## 2022-11-30 NOTE — PLAN OF CARE
The [address] and [address], [place]  [address] [place] [address], [address]  Phone: [phone]  Fax [phone]     Physical Therapy Certification    Dear  Ralph Barone MD,    We had the pleasure of evaluating the following patient for physical therapy services at [address] [place] [address]. A summary of our findings can be found in the initial assessment below. This includes our plan of care. If you have any questions or concerns regarding these findings, please do not hesitate to contact me at the office phone number checked above. Thank you for the referral.       Physician Signature:_______________________________Date:__________________  By signing above (or electronic signature), therapists plan is approved by physician    Patient: Maye White   : 1995   MRN: [id number]  Referring Physician:  Ralph Barone MD     Evaluation Date: 2022      Medical Diagnosis Information:  Diagnosis: L shoulder pain, M25.512                                             Insurance information: PT Insurance Information: [facility], 15K pending, auth required    Precautions/ Contra-indications: none  C-SSRS Triggered by Intake questionnaire (Past 2 wk assessment):   [x] No, Questionnaire did not trigger screening.   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 Pt reports hearing rip sound and immediate shoulder pain. Since then he has seen his company [employer], been icing and modify activity as needed.   Pt reports getting better overhead mobility since incident but still limited. Pain is on outside back of L shoulder. Pain is reproduced with Cross over chest , pushing, ADLs (laundry). Pt reports avoiding sleeping on that side due to pain. Working with company [employer] for first rib elevation and feeling better. Feels dull ache at end of day if it is a hard rehearsal day. Reports that light touch feels different on L deltoid as well. Denies radicular pain down arm, no numbness/tingling, no other UE symptoms.     Relevant Medical History:none  Functional Disability Index: FOTO 63, risk adjusted 55    Pain Scale: 0-4/10  Easing factors: ice, ibuprofen, STM to subscap/lat  Provocative factors: arms over head, pushing, arm crossing body (all of which affect ADLs and his job as a dancer)    Type: []Constant   [x]Intermittent  []Radiating []Localized []other:     Numbness/Tingling: denies    Occupation/School: [employer]     Living Status/Prior Level of Function: Independent with ADLs and IADLs    OBJECTIVE:     CERV ROM     Cervical Flexion 42 deg    Cervical Extension 68 deg    Cervical SB L 35 deg R 54 deg   Cervical rotation L 68 deg R 75 deg        ROM Left Right   Shoulder Flex 155 deg 150 deg   Shoulder Abd 163 mild pain 165   Shoulder ER 59 deg  55 deg   Shoulder IR WNL WNL   Elbow flexion     Elbow extension          Strength  Left Right   Shoulder Flex 4/5 4/5   Shoulder Scap 4+/5 4+/5   Shoulder ER 5/5 5/5   Shoulder IR 4+/5 4+/5   Mid trap 4/5 4/5   Low trap 4-/5 4-/5   Rhomboids     Biceps 5/5 5/5   Triceps 5/5 5/5   Subscap lift off 4-/5 4-/5   Serratus Anterior (supine) 5/5 5/5     Reflexes/Sensation:    [x]Dermatomes/Myotomes intact    []Reflexes equal and normal bilaterally   []Other:    Joint mobility: C6-4 R lat glide, GH glides and WNL   []Normal    [x]Hypo   []Hyper    Palpation: no cervical restriction, first rib L elevated, ant scalene tight, UT/post/middle scalene multiple trigger points    Functional Mobility/Transfers: INDEP    Bandages/Dressings/Incisions: n/a    Gait: (include devices/WB status): WNL    Orthopedic Special Tests: L + Mccloud-Toni, +neer, + empty can for pain, + obriens (worse than empty can), - load and shift                       [x] Patient history, allergies, meds reviewed. Medical chart reviewed. See intake form. Review Of Systems (ROS):  [x]Performed Review of systems (Integumentary, CardioPulmonary, Neurological) by intake and observation. Intake form has been scanned into medical record. Patient has been instructed to contact their primary care physician regarding ROS issues if not already being addressed at this time.       Co-morbidities/Complexities (which will affect course of rehabilitation):   [x]None           Arthritic conditions   []Rheumatoid arthritis (M05.9)  []Osteoarthritis (M19.91)   Cardiovascular conditions   []Hypertension (I10)  []Hyperlipidemia (E78.5)  []Angina pectoris (I20)  []Atherosclerosis (I70)   Musculoskeletal conditions   []Disc pathology   []Congenital spine pathologies   []Prior surgical intervention  []Osteoporosis (M81.8)  []Osteopenia (M85.8)   Endocrine conditions   []Hypothyroid (E03.9)  []Hyperthyroid Gastrointestinal conditions   []Constipation (X87.32)   Metabolic conditions   []Morbid obesity (E66.01)  []Diabetes type 1(E10.65) or 2 (E11.65)   []Neuropathy (G60.9)     Pulmonary conditions   []Asthma (J45)  []Coughing   []COPD (J44.9)   Psychological Disorders  []Anxiety (F41.9)  []Depression (F32.9)   []Other:   []Other:          Barriers to/and or personal factors that will affect rehab potential:              []Age  []Sex              []Motivation/Lack of Motivation                        []Co-Morbidities              []Cognitive Function, education/learning barriers              []Environmental, home barriers              [x]profession/work barriers  []past PT/medical experience  []other:  Justification: physically demanding job involving lifting people overhead     Falls Risk Assessment (30 days):   [x] Falls Risk assessed and no intervention required. 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 Pt reports pain with motions overhead and impingement symptoms. Pt presents with weak scapular musculature bilaterally, elevated first L rib, hypomotile mid-cervical glides which may be attributed to muscle spasm in area since time of injury, positive clinical tests for concern of supraspinatus, glenoid labrum, and general impingement syndrome. Pt's x-rays were negative. May consider MRI is not improving with PT. Pt would benefit from skilled PT address these impairments and support the shoulder joint properly in order to continue a full dance/work schedule pain free. Pt has upcoming [employer] performances starting on Dec. 14th.        Functional Impairments   [x]Noted spinal or UE joint hypomobility   []Noted spinal or UE joint hypermobility   []Decreased UE functional ROM   [x]Decreased UE functional strength   []Abnormal reflexes/sensation/myotomal/dermatomal deficits   [x]Decreased RC/scapular/core strength and neuromuscular control   []other:      Functional Activity Limitations (from functional questionnaire and intake)   []Reduced ability to tolerate prolonged functional positions   [x]Reduced ability or difficulty with changes of positions or transfers between positions   []Reduced ability to maintain good posture and demonstrate good body mechanics with sitting, bending, and lifting   [] Reduced ability or tolerance with driving and/or computer work   [x]Reduced ability to sleep   [x]Reduced ability to perform lifting, reaching, carrying tasks   [x]Reduced ability to tolerate impact through UE   []Reduced ability to reach behind back   []Reduced ability to  or hold objects   [x]Reduced ability to throw or toss an object   []other:    Participation Restrictions   []Reduced participation in self care activities   [x]Reduced participation in home management activities   [x]Reduced participation in work activities   []Reduced participation in social activities. [x]Reduced participation in sport/recreation activities. Classification:   []Signs/symptoms consistent with post-surgical status including decreased ROM, strength and function.   []Signs/symptoms consistent with joint sprain/strain   [x]Signs/symptoms consistent with shoulder impingement   []Signs/symptoms consistent with shoulder/elbow/wrist tendinopathy   [x]Signs/symptoms consistent with Rotator cuff tear   [x]Signs/symptoms consistent with labral tear   []Signs/symptoms consistent with postural dysfunction    []Signs/symptoms consistent with Glenohumeral IR Deficit - <45 degrees   []Signs/symptoms consistent with facet dysfunction of cervical/thoracic spine    [x]Signs/symptoms consistent with pathology which may benefit from Dry needling     []other:     Prognosis/Rehab Potential:      []Excellent   [x]Good    []Fair   []Poor    Tolerance of evaluation/treatment:    []Excellent   [x]Good    []Fair   []Poor  Physical Therapy Evaluation Complexity Justification  [x] A history of present problem with:  [x] no personal factors and/or comorbidities that impact the plan of care;  []1-2 personal factors and/or comorbidities that impact the plan of care  []3 personal factors and/or comorbidities that impact the plan of care  [x] An examination of body systems using standardized tests and measures addressing any of the following: body structures and functions (impairments), activity limitations, and/or participation restrictions;:  [x] a total of 1-2 or more elements   [] a total of 3 or more elements   [] a total of 4 or more elements   [x] A clinical presentation with:  [x] stable and/or uncomplicated characteristics [] evolving clinical presentation with changing characteristics  [] unstable and unpredictable characteristics;   [x] Clinical decision making of [x] low, [] moderate, [] high complexity using standardized patient assessment instrument and/or measurable assessment of functional outcome. [x] EVAL (LOW) 21499 (typically 20 minutes face-to-face)  [] EVAL (MOD) 88774 (typically 30 minutes face-to-face)  [] EVAL (HIGH) 51369 (typically 45 minutes face-to-face)  [] RE-EVAL       PLAN:  Frequency/Duration:  2 days per week for 6 Weeks:  INTERVENTIONS:  [x] Therapeutic exercise including: strength training, ROM, for Upper extremity and core   [x]  NMR activation and proprioception for UE, scap and Core   [x] Manual therapy as indicated for shoulder, scapula and spine to include: Dry Needling/IASTM, STM, PROM, Gr I-IV mobilizations, manipulation. [x] Modalities as needed that may include: thermal agents, E-stim, Biofeedback, US, iontophoresis as indicated  [x] Patient education on joint protection, postural re-education, activity modification, progression of HEP. HEP instruction:   Access Code: [email or web address]  URL: 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 Independent in HEP and progression per patient tolerance, in order to prevent re-injury. [] Progressing: [] Met: [] Not Met: [] Adjusted  2. Patient will have a decrease in pain to facilitate improvement in movement, function, and ADLs as indicated by Functional Deficits. [] Progressing: [] Met: [] Not Met: [] Adjusted     Long Term Goals: To be achieved in: 8 weeks  1. Disability index score of 85% ability or better for the FOTO shoulder to assist with reaching prior level of function. [] Progressing: [] Met: [] Not Met: [] Adjusted  2. Patient will demonstrate an increase in Scap strength to 5/5 to allow for proper functional mobility and to be able to properly support GH joint with overhead movements. [] Progressing: [] Met: [] Not Met: [] Adjusted  3. Patient will return to a full rehearsal/performance schedule of [facility] without increased symptoms or restriction. [] Progressing: [] Met: [] Not Met: [] Adjusted  4. Patient will return to full household/daily activities with no pain in order to sustain independent living (laundry). [] Progressing: [] Met: [] Not Met: [] Adjusted        Electronically signed by:  Keron Sweet, PT, DPT, ATC, [facility] [address] [facility], [facility] Therapist was present, directed the patient's care, made skilled judgement, and was responsible for assessment and treatment of the patient.

## 2022-11-30 NOTE — FLOWSHEET NOTE
[facility] and [address], [facility] [address], [address]  Phone: [phone]  Fax [phone]        Date:  12/3/2022    Patient Name:  Katerin Mosher    :  1995  MRN: [id number]  Restrictions/Precautions:    Medical/Treatment Diagnosis Information:  Diagnosis: L shoulder pain, W82.393     Insurance/Certification information:  PT Insurance Information: [facility], 15K pending, auth required  Physician Information:   Dr. Rj Rosales  Has the plan of care been signed (Y/N):        []  Yes  [x]  No     Date of Patient follow up with Physician: 22      Is this a Progress Report:     []  Yes  [x]  No        If Yes:  Date Range for reporting period:  Beginning 22  Ending    Progress report will be due (10 Rx or 30 days whichever is less):        Recertification will be due (POC Duration  / 90 days whichever is less): 22      Visit # Insurance Allowable Auth Required   In-person 1 [facility], 15k pending [x]  Yes []  No    Telehealth   []  Yes []  No    Total            Functional Scale: FOTO 63, risk adjusted 55  Date assessed:  22          Number of Comorbidities:  [x]0     []1-2    []3+    Latex Allergy:  [x]NO      []YES  Preferred Language for Healthcare:   [x]English       []other:      Pain level:  0-4/10/10     SUBJECTIVE:  See eval    OBJECTIVE: See eval  Observation:   Test measurements:      RESTRICTIONS/PRECAUTIONS: none    Exercises/Interventions:     Therapeutic Ex (47165) Sets/sec/rep Notes/CUES   UT strech at barrè 30 sec x3                                                           Manual Intervention (17738)                                   NMR re-education (93062)  CUES NEEDED   Low trap setting BTB 2x10    Middle row on ball 2x10    90 90 ER on ball 2x10                                  Therapeutic Activity (39188)                                     Pt. Education:  12/3/2022  -pt educated on diagnosis, prognosis and expectations for rehab  -all pt questions were answered    Therapeutic Exercise and NMR EXR  [x] (59926) Provided verbal/tactile cueing for activities related to strengthening, flexibility, endurance, ROM  for improvements in scapular, scapulothoracic and UE control with self care, reaching, carrying, lifting, house/yardwork, driving/computer work.    [] (56512) Provided verbal/tactile cueing for activities related to improving balance, coordination, kinesthetic sense, posture, motor skill, proprioception  to assist with  scapular, scapulothoracic and UE control with self care, reaching, carrying, lifting, house/yardwork, driving/computer work. Therapeutic Activities:    [] (28663 or 24467) Provided verbal/tactile cueing for activities related to improving balance, coordination, kinesthetic sense, posture, motor skill, proprioception and motor activation to allow for proper function of scapular, scapulothoracic and UE control with self care, carrying, lifting, driving/computer work. Home Exercise Program:    [x] (52435) Reviewed/Progressed HEP activities related to strengthening, flexibility, endurance, ROM of scapular, scapulothoracic and UE control with self care, reaching, carrying, lifting, house/yardwork, driving/computer work  [] (15310) Reviewed/Progressed HEP activities related to improving balance, coordination, kinesthetic sense, posture, motor skill, proprioception of scapular, scapulothoracic and UE control with self care, reaching, carrying, lifting, house/yardwork, driving/computer work      Access Code: [email or web address]  URL: [email or web address]  Date: 11/30/2022  Prepared by: Celestina Suero     Exercises  Prone Middle Trapezius with Legs Straight on Swiss Ball - 1 x daily - 7 x weekly - 3 sets - 10 reps  Prone External Rotation at 90 Degree Abduction on Swiss Ball - 1 x daily - 7 x weekly - 3 sets - 10 reps  Standing Upper Trapezius Stretch - 1 x daily - 7 x weekly - 3 sets - 10 reps  Standing Low Trap Setting with Resistance at [address] - 1 x daily - 7 x weekly - 3 sets - 10 reps    Manual Treatments:  PROM / STM / Oscillations-Mobs:  G-I, II, III, IV (PA's, Inf., Post.)  [] (26900) Provided manual therapy to mobilize soft tissue/joints of cervical/CT, scapular GHJ and UE for the purpose of modulating pain, promoting relaxation,  increasing ROM, reducing/eliminating soft tissue swelling/inflammation/restriction, improving soft tissue extensibility and allowing for proper ROM for normal function with self care, reaching, carrying, lifting, house/yardwork, driving/computer work    Modalities:      Charges  Timed Code Treatment Minutes: 23   Total Treatment Minutes: 50       BWC time in/ time out: 11:05-11:55   TE time in /out N/a   Manual time in/out N/a   Neuro re ed time in/out 11:32-11:55   Untimed minutes 11:05 - 11:32     [x] EVAL - LOW (01756)   [] EVAL - MOD (55038)  [] EVAL - HIGH (47242)  [] RE-EVAL (78116)    [] PC(98271) x       [] Ionto  [x] NMR (78780) x    2   [] Vaso  [] Manual (01453) x       [] Ultrasound  [] TA x        [] Mech Traction (25028)  [] ES (un) (75981):     [] ES(attended) (59609)   [] Dry Needling 1-2 muscles (27737):  [] Dry Needling 3+ muscles (789915  [] Other:     GOALS:  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 Independent in HEP and progression per patient tolerance, in order to prevent re-injury. [] Progressing: [] Met: [] Not Met: [] Adjusted  2. Patient will have a decrease in pain to facilitate improvement in movement, function, and ADLs as indicated by Functional Deficits. [] Progressing: [] Met: [] Not Met: [] Adjusted     Long Term Goals: To be achieved in: 8 weeks  1. Disability index score of 85% or better for the FOTO shoulder to assist with reaching prior level of function.    [] Progressing: [] Met: [] Not Met: [] Adjusted  2. Patient will demonstrate an increase in Scap strength to 5/5 to allow for proper functional mobility and to be able to properly support GH joint with overhead movements. [] Progressing: [] Met: [] Not Met: [] Adjusted  3. Patient will return to a full rehearsal/performance schedule of [employer] without increased symptoms or restriction. [] Progressing: [] Met: [] Not Met: [] Adjusted  4. Patient will return to full household/daily activities with no pain in order to sustain independent living (laundry). [] Progressing: [] Met: [] Not Met: [] Adjusted      Progression Towards Functional goals:  [] Patient is progressing as expected towards functional goals listed. [] Progression is slowed due to complexities listed. [] Progression has been slowed due to co-morbidities. [x] Plan just implemented, too soon to assess goals progression  [] Other:     ASSESSMENT:  Pt reports pain with motions overhead and impingement symptoms. Pt presents with weak scapular musculature bilaterally and an elevated first R rib. Pt would benefit from skilled PT to improve upon the strength deficits and support the shoulder joint properly in order to continue a full dance/work schedule pain free. Overall Progression Towards Functional goals/ Treatment Progress Update:  [] Patient is progressing as expected towards functional goals listed. [] Progression is slowed due to complexities/Impairments listed. [] Progression has been slowed due to co-morbidities.   [x] Plan just implemented, too soon to assess goals progression <30days   [] Goals require adjustment due to lack of progress  [] Patient is not progressing as expected and requires additional follow up with physician  [] Other    Prognosis for POC: [x] Good [] Fair  [] Poor      Patient requires continued skilled intervention: [x] Yes  [] No    Treatment/Activity Tolerance:  [x] Patient able to complete treatment  [] Patient limited by fatigue  [] Patient limited by pain    [] Patient limited by other medical complications  [] Other:         Return to Play: (if applicable)   []  Stage 1: Intro to Strength   []  Stage 2: Return to Run and Strength   []  Stage 3: Return to Jump and Strength   []  Stage 4: Dynamic Strength and Agility   []  Stage 5: Sport Specific Training     []  Ready to Return to Play, Meets All Above Stages   []  Not Ready for Return to Sports   Comments:                               PLAN: See eval. Pt to be seen 2 times a week for 8 weeks. [] Continue per plan of care [] Alter current plan (see comments above)  [x] Plan of care initiated [] Hold pending MD visit [] Discharge      Electronically signed by:  Araceli Sin PT, DPT, ATC, OCS [id number]  KIM Willis Therapist was present, directed the patient's care, made skilled judgement, and was responsible for assessment and treatment of the patient. Note: If patient does not return for scheduled/ recommended follow up visits, this note will serve as a discharge from care along with most recent update on progress.

## 2022-11-30 NOTE — PROGRESS NOTES
12 [facility]  History and Physical  Shoulder Pain    Date:  2022    Name:  Lissette Diaz  Address:  [address] [place] [address],[address] [address]    :  1995      Age:   27 y.o.    SSN:  [id number]      Medical Record Number:  [id number]    Reason for Visit:    Shoulder Pain (NP WC LEFT SHOULDER)      HPI:   Lissette Diaz is a 27 y.o. male who presents to our office today complaining of  left shoulder pain. 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 During a performance on 2022 when he was lifting another dancer He felt a sudden tear, a pop and felt his left shoulder shift. He began having increased pain since then. He was able to finish off the performance and since then have been able to perform again on stage. However the patient has continued to have persistent symptoms of pain and discomfort despite going to formal physical therapy over the last 3 weeks. Pain Assessment  Location of Pain: Shoulder  Location Modifiers: Left  Severity of Pain: 3  Quality of Pain: Throbbing, Sharp, Dull, Aching  Duration of Pain: A few minutes  Frequency of Pain: Intermittent  Aggravating Factors: Other (Comment), Straightening, Stretching, Exercise  Limiting Behavior: Yes  Relieving Factors: Rest, Ice, Nsaids, Exercise  Result of Injury: Yes  Work-Related Injury: Yes  Are there other pain locations you wish to document?: No    Review of Systems:  A 14 point review of systems available in the scanned medical record as documented by the patient. The review is negative with the exception of those things mentioned in the History of Present Illness and Past Medical History. Past History:  History reviewed. No pertinent past medical history. History reviewed. No pertinent surgical history. No current outpatient medications on file prior to visit. No current facility-administered medications on file prior to visit.      Social History Socioeconomic History    Marital status: Single     Spouse name: Not on file    Number of children: Not on file    Years of education: Not on file    Highest education level: Not on file   Occupational History    Not on file   Tobacco Use    Smoking status: Former     Types: Cigarettes    Smokeless tobacco: Never   Substance and Sexual Activity    Alcohol use: Not on file    Drug use: Not on file    Sexual activity: Not on file   Other Topics Concern    Not on file   Social History Narrative    Not on file     Social Determinants of Health     Financial Resource Strain: Not on file   Food Insecurity: Not on file   Transportation Needs: Not on file   Physical Activity: Not on file   Stress: Not on file   Social Connections: Not on file   Intimate Partner Violence: Not on file   Housing Stability: Not on file     History reviewed. No pertinent family history. Current Medications:    No current outpatient medications on file. No current facility-administered medications for this visit. Allergies:  No Known Allergies    Physical Exam:  There were no vitals filed for this visit. General: Sujey Drummond is a healthy and well appearing 27 y.o. male who is sitting comfortably in our office in acute distress. General Exam:   Constitutional: Patient is adequately groomed with no evidence of malnutrition  DTRs: Deep tendon reflexes are intact  Mental Status: The patient is oriented to time, place and person. The patient's mood and affect are appropriate. Lymphatic: The lymphatic examination bilaterally reveals all areas to be without enlargement or induration. Vascular: Examination reveals no swelling or calf tenderness. Peripheral pulses are palpable and 2+. Neurological: The patient has good coordination. There is no weakness or sensory deficit. Neuro: alert.  Oriented X 3  Eyes: Extra-ocular muscles intact  Mouth: Oral mucosa moist. No perioral lesions  Pulm: Respirations unlabored and regular. left Shoulder Exam:  Inspection:  No gross deformities, no signs of infection. Palpation:  There is no crepitus. He does have a posterior click. He has mild tenderness in the pec/proximal biceps region that is mild. No tenderness to palpation over the rotator cuff footprint, no tenderness over the posterior joint line or in the. Active Range of Motion: Forward elevation of 170, abduction of 170, external rotation with elbow at the side 40, internal rotation to the back is T7 or 8 versus T6 on the right    Passive Range of Motion: Deferred. Strength:   External rotation with resistance with elbow at the side 5/5, internal rotation with resistance with elbow at the side 5/5, Champagne toast testing 5/5, Jobes test 5/5    Special Tests: Mildly positive Westfield's, negative speeds, mildly positive throwing test, negative dynamic shear, negative apprehension, positive Jerk test with shift/but no clunk. No Shola muscle deformity. Neurovascular: Sensation to light touch is intact, no motor deficits, palpable radial pulses 2+  Comparison right Shoulder Examination:    Inspection:   No gross deformities, no signs of infection. Palpation:  There is no crepitus. Non-tender to palpation over the [facility], rotator cuff, bicipital groove or posterior joint line. Active Range of Motion: Forward elevation of 170, abduction of 170, external rotation with elbow at the side 40, internal rotation to the back is T6    Passive Range of Motion:  deferred    Strength:   External rotation with resistance with elbow at the side 5/5, internal rotation with resistance with elbow at the side 5/5, Champagne toast testing 5/5, Jobes test 5/5    Special Tests:   No Shola muscle deformity. Neurovascular: Sensation to light touch is intact, no motor deficits, palpable radial pulses 2+    Laboratory:  No visits with results within 14 Day(s) from this visit.    Latest known visit with results is:   No results found for any previous visit. No results found for this or any previous visit (from the past 24 hour(s)). Radiographic:  3 xray views of the left  shoulder including True AP in internal and external and axillary lateral were taken in our office today reveal no fractures, dislocations, visible tumors, or signs of acute trauma. Impressions: unremarkable plain radiographs of the left shoulder in  a 27year old male ballet dancer. Self assessment questionnaires including ASES and Simple Shoulder Test were completed today. 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 This most likely relates to a posterior labrum tear as a result of his work-related injury sustained when he lifted the dancer up with his left arm. .    Impression:  Encounter Diagnoses   Name Primary? Acute pain of left shoulder Yes    Sprain of left shoulder, unspecified shoulder sprain type, initial encounter        Office Procedures:  Orders Placed This Encounter   Procedures    XR SHOULDER LEFT (MIN 2 VIEWS)     Standing Status:   Future     Number of Occurrences:   1     Standing Expiration Date:   11/30/2023     Order Specific Question:   Reason for exam:     Answer:   PAIN    MRI SHOULDER LEFT WO CONTRAST     Standing Status:   Future     Standing Expiration Date:   11/30/2023     Order Specific Question:   Reason for exam:     Answer:   Left shoulder pain     Order Specific Question:   What is the sedation requirement? Answer:   None       Plan:  Pertinent imaging and examination findings were reviewed with Andrae Hernandes. The etiology, natural history, and treatment options for the disorder were discussed. The roles of activity modifications, medications, cryotherapy and heat, injections, physical therapy, and surgical interventions were all described to the patient and questions were answered.    We feel that his exam is consistent with a labral tear of some sort.  We hope that most likely that we will be able to treat this nonoperatively. We will go and have him continue a physical therapy that is more targeted towards scapular stabilizers, rotator cuff strengthening. Therapy orders were placed in the chart today. We do recommend obtaining an MRI to evaluate the labrum for a possible tear and we will see him back once the MRI is completed    Juliana Nelson will follow up in 4 weeks and/or as needed. He was in agreement with this plan and all questions were answered to the patient's satisfaction. He was encouraged to call with any questions. 11/30/2022  10:23 AM      Karina Ruff PA-C  Orthopaedic Sports Medicine Physician Assistant    During this examination, I, Karina Ruff PA-C, functioned as a scribe for Dr. Jocelyn Zavala. This dictation was performed with a verbal recognition program (DRAGON) and it was checked for errors. It is possible that there are still dictated errors within this office note. If so, please bring any errors to my attention for an addendum. All efforts were made to ensure that this office note is accurate.  __________________  I, Dr. Jocelyn Zavala, personally performed the services described in this documentation as described by Karina Ruff PA-C in my presence, and it is both accurate and complete. Kyler Mendoza MD, PhD  11/30/2022

## 2022-11-30 NOTE — LETTER
Physical Therapy Rehabilitation Referral    Patient Name:  Arminda Sorto      YOB: 1995    Diagnosis:    1. Acute pain of left shoulder        Precautions:     [x] Evaluate and Treat    Post Op Instructions:  [] Continuous passive motion (CPM) [] Elbow ROM  [x] Exercise in plane of scapula  []  Strengthening     [] Pulley and instruction   [x] Home exercise program (copy to patient)   [] Sling when arm at risk  [] Sling or brace at all times   [] AAROM: Forward elevation to  140            [] AAROM: External rotation  To  40    [] Isometric external rotator strengthening [] AAROM: internal rotation: up the back  [x] Isometric abductor strengthening  [] AAROM: Internal abduction   [] Isometric internal rotator strengthening [] AAROM: cross-body adduction             Stretching:     Strengthening:  [] Four quadrant (FE, ER, IR, CBA)  [x] Rotator cuff (ER, IR, Abd)  [] Forward Elevation    [x] External Rotators     [] External Rotation    [x] Internal Rotators  [] Internal Rotation: up/back   [x] Abductors     [] Internal Rotation: supine in abduction  [] Sleeper Stretch    [] Flexors  [] Cross-body abduction    [] Extensors  [] Pendulum (FE, Abd/Add, cw/ccw)  [x] Scapular Stabilizers   [] Wall-walking (FE, Abd)        [x] Shoulder shrugs     [] Table slides (FE)                [x] Rhomboid pinch  [] Elbow (flex, ext, pron, sup)        [x] Lat.  Pull downs     [] Medial epicondylitis program       [x] Forward punch   [] Lateral epicondylitis program       [x] Internal rotators     [] Progressive resistive exercises  [] Bench Press        [] Bench press plus  Activities:     [] Lateral pull-downs  [] Rowing     [] Progressive two-hand supine press  [] Stepper/Exercise bike   [] Biceps: curls/supination  [] Swimming  [] Water exercises    Modalities:     Return to Sport:  [x] Of Choice      [x] Plyometrics  [] Ultrasound     [x] Rhythmic stabilization  [] Iontophoresis    [] Core strengthening   [] Moist heat     [] Sports specific program:   [] Massage         [x] Cryotherapy      [] Electrical stimulation     [] Paraffin  [] Whirlpool  [] TENS    [x] Home exercise program (copy to patient). Perform exercises for:   15     minutes    3      times/day  [x] Supervised physical therapy  Frequency: []  1x week  [x] 2x week  [] 3x week  [] Other:   Duration: [] 2 weeks   [] 4 weeks  [x] 6 weeks  [] Other:     Additional Instructions:     Kyler Patel MD, PhD

## 2022-12-02 ENCOUNTER — HOSPITAL ENCOUNTER (OUTPATIENT)
Dept: PHYSICAL THERAPY | Age: 27
Setting detail: THERAPIES SERIES
Discharge: HOME | End: 2022-12-02
Payer: COMMERCIAL

## 2022-12-02 PROCEDURE — 97140 MANUAL THERAPY 1/> REGIONS: CPT

## 2022-12-02 PROCEDURE — 97112 NEUROMUSCULAR REEDUCATION: CPT

## 2022-12-02 NOTE — FLOWSHEET NOTE
[facility] and [facility] [place], [place], [address] [place] [address], [address]  Phone: [phone]  Fax [phone]        Date:  2022    Patient Name:  Seda Michel    :  1995  MRN: [id number]  Restrictions/Precautions:    Medical/Treatment Diagnosis Information:  Diagnosis: L shoulder pain, P90.268    Insurance/Certification information:  PT Insurance Information: BWC, 15K pending, auth required  Physician Information:       Has the plan of care been signed (Y/N):        []  Yes  [x]  No     Date of Patient follow up with Physician: 22      Is this a Progress Report:     []  Yes  [x]  No        If Yes:  Date Range for reporting period:  Beginning 22  Ending    Progress report will be due (10 Rx or 30 days whichever is less):        Recertification will be due (POC Duration  / 90 days whichever is less): 22      Visit # Insurance Allowable Auth Required   In-person 2 BWC, 15k pending [x]  Yes []  No    Telehealth   []  Yes []  No    Total            Functional Scale: FOTO 63, risk adjusted 55  Date assessed:  22          Number of Comorbidities:  [x]0     []1-2    []3+    Latex Allergy:  [x]NO      []YES  Preferred Language for Healthcare:   [x]English       []other:      Pain level:  0-4/1010     SUBJECTIVE:  I have noticed that when I internally rotate my shoulder is when I have pain.     OBJECTIVE:   Observation:   Test measurements:      RESTRICTIONS/PRECAUTIONS: none    Exercises/Interventions:     Therapeutic Ex (03580) Sets/sec/rep Notes/CUES   UT strech at barrè    TB IR ER walkouts NV    Serratus punches NV    Body blade NV                                            Manual Intervention (51899)     Scapular mobs NV    STM pec mj, subscap, lat 20 min    Dry needling assess & tx 15 min                   NMR re-education (32790)  CUES NEEDED   Low trap setting    Middle row on ball    90 90 ER on ball Prone Y,T,W:  Half up then down  Perturbations  pulses   10x1  1 min random  30x2   HEP    HEP   Prone IR ER NV    Scap setting 15 lbs 10x2                   Therapeutic Activity (58583)                                        Consent signed 12/2/2022 and precautions addressed. See media tab. Muscle  Needle Size Technique Notes IES   Site 1 Upper trap x 3 .3x40mm [] Pistoning / [x]  Threading  [x]  Winding/Coning Twitch response, positive release []    Site 2  .[id number] [] Pistoning / []  Threading  []  Winding/Coning  []    Site 3  .3x50mm [] Pistoning / []  Threading  []  Winding/Coning  []    Site 4  .[id number] [] Pistoning / []  Threading  []  Winding/Coning  []    Site 5  .3x40mm [] Pistoning / []  Threading  []  Winding/Coning  []    Site 6   [] Pistoning / []  Threading  []  Winding/Coning  []    Site 7   [] Pistoning / []  Threading  []  Winding/Coning  []    Site 8   [] Pistoning / []  Threading  []  Winding/Coning  []    Site 9   [] Pistoning / []  Threading  []  Winding/Coning  []    Site 10   [] Pistoning / []  Threading  []  Winding/Coning  []      **The above techniques were used to restore functional range of motion, reduce muscle spasm, and induce healing in the corresponding musculature by means of intramuscular mobilization. Clean Technique was utilized today while applying the Dry needling treatment. The treatment sites where cleaned with 70% solution of isopropyl alcohol. The PT washed their hands and utilized treatment gloves along with hand  prior to inserting the needles. All needles where removed and discarded in the appropriate sharps container. MD has given verbal and/or written approval for this treatment. **          ** Educated patient on anatomy, trigger point etiology, expectations for TDN (bruising, soreness, etc), outcomes, and recommendations for exercise.  **       Pt. Education:  12/2/2022  -pt educated on diagnosis, prognosis and expectations for rehab  -all pt questions were answered    Therapeutic Exercise and NMR EXR  [x] (99744) Provided verbal/tactile cueing for activities related to strengthening, flexibility, endurance, ROM  for improvements in scapular, scapulothoracic and UE control with self care, reaching, carrying, lifting, house/yardwork, driving/computer work.    [] (70414) Provided verbal/tactile cueing for activities related to improving balance, coordination, kinesthetic sense, posture, motor skill, proprioception  to assist with  scapular, scapulothoracic and UE control with self care, reaching, carrying, lifting, house/yardwork, driving/computer work. Therapeutic Activities:    [] (97939 or 00100) Provided verbal/tactile cueing for activities related to improving balance, coordination, kinesthetic sense, posture, motor skill, proprioception and motor activation to allow for proper function of scapular, scapulothoracic and UE control with self care, carrying, lifting, driving/computer work.      Home Exercise Program:    [x] (86295) Reviewed/Progressed HEP activities related to strengthening, flexibility, endurance, ROM of scapular, scapulothoracic and UE control with self care, reaching, carrying, lifting, house/yardwork, driving/computer work  [] (78806) Reviewed/Progressed HEP activities related to improving balance, coordination, kinesthetic sense, posture, motor skill, proprioception of scapular, scapulothoracic and UE control with self care, reaching, carrying, lifting, house/yardwork, driving/computer work      Manual Treatments:  PROM / STM / Oscillations-Mobs:  G-I, II, III, IV (PA's, Inf., Post.)  [x] (87441) Provided manual therapy to mobilize soft tissue/joints of cervical/CT, scapular GHJ and UE for the purpose of modulating pain, promoting relaxation,  increasing ROM, reducing/eliminating soft tissue swelling/inflammation/restriction, improving soft tissue extensibility and allowing for proper ROM for normal function with self care, joint with overhead movements. [] Progressing: [] Met: [] Not Met: [] Adjusted  3. Patient will return to a full [facility]al/[facility] without increased symptoms or restriction. [] Progressing: [] Met: [] Not Met: [] Adjusted  4. Patient will return to full household/daily activities with no pain in order to sustain independent living (laundry). [] Progressing: [] Met: [] Not Met: [] Adjusted      Progression Towards Functional goals:  [] Patient is progressing as expected towards functional goals listed. [] Progression is slowed due to complexities listed. [] Progression has been slowed due to co-morbidities. [x] Plan just implemented, too soon to assess goals progression  [] Other:     ASSESSMENT:  Pt reports no significant increase pain during paux rehearsals yesterday which is a sign of improvement. Reports that HEP is helpful and fatigues with it. Also reports relief with manuals and dry needling today. Overall Progression Towards Functional goals/ Treatment Progress Update:  [] Patient is progressing as expected towards functional goals listed. [] Progression is slowed due to complexities/Impairments listed. [] Progression has been slowed due to co-morbidities.   [x] Plan just implemented, too soon to assess goals progression <30days   [] Goals require adjustment due to lack of progress  [] Patient is not progressing as expected and requires additional follow up with physician  [] Other    Prognosis for POC: [x] Good [] Fair  [] Poor      Patient requires continued skilled intervention: [x] Yes  [] No    Treatment/Activity Tolerance:  [x] Patient able to complete treatment  [] Patient limited by fatigue  [] Patient limited by pain    [] Patient limited by other medical complications  [] Other:         Return to Play: (if applicable)   []  Stage 1: Intro to Strength   []  Stage 2: Return to Run and Strength   []  Stage 3: Return to Jump and Strength   []  Stage 4: Dynamic Strength and Agility   []  Stage 5: Sport Specific Training     []  Ready to Return to Play, Meets All Above Stages   []  Not Ready for Return to Sports   Comments:                               PLAN: See eval  [x] Continue per plan of care [] Alter current plan (see comments above)  [] Plan of care initiated [] Hold pending MD visit [] Discharge      Electronically signed by:  DANELLE Chase SPT Therapist was present, directed the patient's care, made skilled judgement, and was responsible for assessment and treatment of the patient. Note: If patient does not return for scheduled/ recommended follow up visits, this note will serve as a discharge from care along with most recent update on progress.

## 2022-12-05 ENCOUNTER — HOSPITAL ENCOUNTER (OUTPATIENT)
Dept: PHYSICAL THERAPY | Age: 27
Setting detail: THERAPIES SERIES
Discharge: HOME | End: 2022-12-05
Payer: COMMERCIAL

## 2022-12-05 PROCEDURE — 97110 THERAPEUTIC EXERCISES: CPT

## 2022-12-05 PROCEDURE — 20560 NDL INSJ W/O NJX 1 OR 2 MUSC: CPT

## 2022-12-05 PROCEDURE — 97140 MANUAL THERAPY 1/> REGIONS: CPT

## 2022-12-05 NOTE — FLOWSHEET NOTE
The [facility] and [address] [facility], [address]  Phone: [phone]  Fax [phone]        Date:  2022    Patient Name:  Arminda Sorto    :  1995  MRN: [id number]  Restrictions/Precautions:    Medical/Treatment Diagnosis Information:  Diagnosis: L shoulder pain, W92.349    Insurance/Certification information:  PT Insurance Information: BWC, 15K pending, auth required  Physician Information:       Has the plan of care been signed (Y/N):        []  Yes  [x]  No     Date of Patient follow up with Physician: 22      Is this a Progress Report:     []  Yes  [x]  No        If Yes:  Date Range for reporting period:  Beginning 22  Ending    Progress report will be due (10 Rx or 30 days whichever is less): 15/33/03       Recertification will be due (POC Duration  / 90 days whichever is less): 22      Visit # Insurance Allowable Auth Required   In-person 3 BWC, 15k pending [x]  Yes []  No    Telehealth   []  Yes []  No    Total            Functional Scale: FOTO 63, risk adjusted 55  Date assessed:  22          Number of Comorbidities:  [x]0     []1-2    []3+    Latex Allergy:  [x]NO      []YES  Preferred Language for Healthcare:   [x]English       []other:      Pain level:  0-4/10/10     SUBJECTIVE:  I did yoga on Saturday with pain when I had arms above head pressing in together. I have not done the new exercises that we did Friday. Rehearsals and classes today went well. I did not notice anything in my shoulder.     OBJECTIVE:   Observation:   Test measurements:      RESTRICTIONS/PRECAUTIONS: none    Exercises/Interventions:     Therapeutic Ex (52154) Sets/sec/rep Notes/CUES   UT strech at barrè    TB IR ER walkouts    Serratus punches    Body blade  Scaption vertical  Scaption forward horizontal  D2 with pulses   45 sec x2  45 sec x2  45 sec x2                                     Manual Intervention (01451)    Scapular mobs    STM subscap, lat 15 min    Dry needling assess & tx 15 min                NMR re-education (95011) CUES NEEDED   Low trap setting    Middle row on ball    90 90 ER on ball    Prone Y,T,W:  Half up then down  Perturbations  pulses   HEP    HEP   Prone IR ER    Scap setting    Bosu plank    Wall press         Therapeutic Activity (86569)                                        Consent signed 12/2/2022 and precautions addressed. See media tab. Muscle  Needle Size Technique Notes IES   Site 1 Upper trap x 1 .3x40mm [] Pistoning / [x]  Threading  [x]  Winding/Coning Twitch response, positive release []    Site 2 Middle trap x 2 .96s96or [] Pistoning / [x]  Threading  [x]  Winding/Coning Pulling skin away and using needle in horizontal to torso direction []    Site 3  .3x50mm [] Pistoning / []  Threading  []  Winding/Coning  []    Site 4  .28b07er [] Pistoning / []  Threading  []  Winding/Coning  []    Site 5  .3x40mm [] Pistoning / []  Threading  []  Winding/Coning  []    Site 6   [] Pistoning / []  Threading  []  Winding/Coning  []    Site 7   [] Pistoning / []  Threading  []  Winding/Coning  []    Site 8   [] Pistoning / []  Threading  []  Winding/Coning  []    Site 9   [] Pistoning / []  Threading  []  Winding/Coning  []    Site 10   [] Pistoning / []  Threading  []  Winding/Coning  []      **The above techniques were used to restore functional range of motion, reduce muscle spasm, and induce healing in the corresponding musculature by means of intramuscular mobilization. Clean Technique was utilized today while applying the Dry needling treatment. The treatment sites where cleaned with 70% solution of isopropyl alcohol. The PT washed their hands and utilized treatment gloves along with hand  prior to inserting the needles. All needles where removed and discarded in the appropriate sharps container. MD has given verbal and/or written approval for this treatment.  **          ** Educated patient on anatomy, trigger point etiology, expectations for TDN (bruising, soreness, etc), outcomes, and recommendations for exercise. **       Pt. Education:  12/5/2022  -pt educated on diagnosis, prognosis and expectations for rehab  -all pt questions were answered    Therapeutic Exercise and NMR EXR  [x] (15028) Provided verbal/tactile cueing for activities related to strengthening, flexibility, endurance, ROM  for improvements in scapular, scapulothoracic and UE control with self care, reaching, carrying, lifting, house/yardwork, driving/computer work.    [] (73337) Provided verbal/tactile cueing for activities related to improving balance, coordination, kinesthetic sense, posture, motor skill, proprioception  to assist with  scapular, scapulothoracic and UE control with self care, reaching, carrying, lifting, house/yardwork, driving/computer work. Therapeutic Activities:    [] (38871 or 37481) Provided verbal/tactile cueing for activities related to improving balance, coordination, kinesthetic sense, posture, motor skill, proprioception and motor activation to allow for proper function of scapular, scapulothoracic and UE control with self care, carrying, lifting, driving/computer work.      Home Exercise Program:    [x] (53794) Reviewed/Progressed HEP activities related to strengthening, flexibility, endurance, ROM of scapular, scapulothoracic and UE control with self care, reaching, carrying, lifting, house/yardwork, driving/computer work  [] (07482) Reviewed/Progressed HEP activities related to improving balance, coordination, kinesthetic sense, posture, motor skill, proprioception of scapular, scapulothoracic and UE control with self care, reaching, carrying, lifting, house/yardwork, driving/computer work      Manual Treatments:  PROM / STM / Oscillations-Mobs:  G-I, II, III, IV (PA's, Inf., Post.)  [x] (33210) Provided manual therapy to mobilize soft tissue/joints of cervical/CT, scapular GHJ and UE for the purpose of modulating pain, promoting relaxation,  increasing ROM, reducing/eliminating soft tissue swelling/inflammation/restriction, improving soft tissue extensibility and allowing for proper ROM for normal function with self care, reaching, carrying, lifting, house/yardwork, driving/computer work    Modalities:  none    Trigger point Dry Needling:  fine needle insertion into myofascial trigger points to stimulate a healing response  [x] (58996) Needle insertion without injection: 1 or 2 muscles  [] (54910) Needle insertion without injection: 3 or more muscles    Modalities:        [] GR/ESU 15 min    [] GR 15 min  [] ESU     [] CP    [] MHP    [] declined     Charges:  Timed Code Treatment Minutes: 38   Total Treatment Minutes: 38     BWC time in/ time out: 2:00-2:38   TE time in /out 2:30-2:38   Manual time in/out 2:00-2:30   Neuro re ed time in/out none   Untimed minutes none       Charges  [] EVAL - LOW (17775)   [] EVAL - MOD (86986)  [] EVAL - HIGH (31577)  [] RE-EVAL (02066)    [x] TL(32655) x   1    [] Ionto  [] NMR (37895) x       [] Vaso  [x] Manual (32010) x  1    [] Ultrasound  [] TA x        [] Mech Traction (20205)  [] ES (un) (46566):     [] ES(attended) (99667)   [x] Dry Needling 1-2 muscles (95793):  [] Dry Needling 3+ muscles (528949  [] Other:     GOALS:  Patient stated goal: full return to dance and partner work with no pain  [x] Progressing: [] Met: [] Not Met: [] Adjusted     Therapist goals for Patient:   Short Term Goals: To be achieved in: 2 weeks  1. Independent in HEP and progression per patient tolerance, in order to prevent re-injury. [x] Progressing: [] Met: [] Not Met: [] Adjusted  2. Patient will have a decrease in pain to facilitate improvement in movement, function, and ADLs as indicated by Functional Deficits. [x] Progressing: [] Met: [] Not Met: [] Adjusted     Long Term Goals: To be achieved in: 8 weeks  1.  Disability index score of 85% or better for the Cl Rodriguez shoulder to assist with reaching prior level of function. [] Progressing: [] Met: [] Not Met: [] Adjusted  2. Patient will demonstrate an increase in Scap strength to 5/5 to allow for proper functional mobility and to be able to properly support GH joint with overhead movements. [] Progressing: [] Met: [] Not Met: [] Adjusted  3. Patient will return to a full rehearsal/performance schedule of [facility] without increased symptoms or restriction. [x] Progressing: [] Met: [] Not Met: [] Adjusted  4. Patient will return to full household/daily activities with no pain in order to sustain independent living (laundry). [] Progressing: [] Met: [] Not Met: [] Adjusted      Progression Towards Functional goals:  [] Patient is progressing as expected towards functional goals listed. [] Progression is slowed due to complexities listed. [] Progression has been slowed due to co-morbidities. [x] Plan just implemented, too soon to assess goals progression  [] Other:     ASSESSMENT:  Pt reports no significant increase pain during paux rehearsals today which is a sign of improvement. Also reports relief with manuals and dry needling today. Pt had to leave early today to get to rehearsals. Overall Progression Towards Functional goals/ Treatment Progress Update:  [] Patient is progressing as expected towards functional goals listed. [] Progression is slowed due to complexities/Impairments listed. [] Progression has been slowed due to co-morbidities.   [x] Plan just implemented, too soon to assess goals progression <30days   [] Goals require adjustment due to lack of progress  [] Patient is not progressing as expected and requires additional follow up with physician  [] Other    Prognosis for POC: [x] Good [] Fair  [] Poor      Patient requires continued skilled intervention: [x] Yes  [] No    Treatment/Activity Tolerance:  [x] Patient able to complete treatment  [] Patient limited by fatigue  [] Patient limited by pain    [] Patient limited by other medical complications  [] Other:         Return to Play: (if applicable)   []  Stage 1: Intro to Strength   []  Stage 2: Return to Run and Strength   []  Stage 3: Return to Jump and Strength   []  Stage 4: Dynamic Strength and Agility   []  Stage 5: Sport Specific Training     []  Ready to Return to Play, Meets All Above Stages   []  Not Ready for Return to Sports   Comments:                               PLAN: See eval  [x] Continue per plan of care [] Alter current plan (see comments above)  [] Plan of care initiated [] Hold pending MD visit [] Discharge      Electronically signed by:  Mary Earl, PT  Ranjan South, SPT Therapist was present, directed the patient's care, made skilled judgement, and was responsible for assessment and treatment of the patient. Note: If patient does not return for scheduled/ recommended follow up visits, this note will serve as a discharge from care along with most recent update on progress.

## 2022-12-07 ENCOUNTER — HOSPITAL ENCOUNTER (OUTPATIENT)
Dept: PHYSICAL THERAPY | Age: 27
Setting detail: THERAPIES SERIES
Discharge: HOME | End: 2022-12-07
Payer: COMMERCIAL

## 2022-12-07 NOTE — FLOWSHEET NOTE
The [address]  Phone: [phone]  Fax [phone]    Physical Therapy  Cancellation/No-show Note  Patient Name:  Domenico Huston  :  1995   Date:  2022  Cancelled visits to date: 0  No-shows to date: 1    Patient status for today's appointment patient:  []  Cancelled  []  Rescheduled appointment  [x]  No-show     Reason given by patient:  []  Patient ill  []  Conflicting appointment  []  No transportation    []  Conflict with work  [x]  No reason given  []  Other:     Comments:      Phone call information:   []  Phone call made today to patient at _ time at number provided:      []  Patient answered, conversation as follows:    []  Patient did not answer, message left as follows:  [x]  Phone call not made today    Electronically signed by:  Celestina Suero, PT  Flo Matamoros, SPT Therapist was present, directed the patient's care, made skilled judgement, and was responsible for assessment and treatment of the patient.

## 2022-12-12 ENCOUNTER — HOSPITAL ENCOUNTER (OUTPATIENT)
Dept: PHYSICAL THERAPY | Age: 27
Setting detail: THERAPIES SERIES
Discharge: HOME | End: 2022-12-12
Payer: COMMERCIAL

## 2022-12-12 NOTE — FLOWSHEET NOTE
[address], [address] [place] [address] Road  Phone: [phone]  Fax [phone]    Physical Therapy  Cancellation/No-show Note  Patient Name:  Jessy Russo  :  1995   Date:  2022  Cancelled visits to date: 0  No-shows to date: 2    Patient status for today's appointment patient:  []  Cancelled  []  Rescheduled appointment  [x]  No-show     Reason given by patient:  []  Patient ill  []  Conflicting appointment  []  No transportation    []  Conflict with work  [x]  No reason given  []  Other:     Comments:      Phone call information:   [x]  Phone call made today to patient at 11:00am time at number provided:      []  Patient answered, conversation as follows:    [x]  Patient did not answer, message left as follows: about missed appointment and to call or stop by to reschedule  []  Phone call not made today    Electronically signed by:  Mary Earl, PT  Ranjan South, [facility] [place] Therapist was present, directed the patient's care, made skilled judgement, and was responsible for assessment and treatment of the patient.

## 2022-12-14 ENCOUNTER — HOSPITAL ENCOUNTER (OUTPATIENT)
Dept: PHYSICAL THERAPY | Age: 27
Setting detail: THERAPIES SERIES
Discharge: HOME | End: 2022-12-14
Payer: COMMERCIAL

## 2022-12-14 NOTE — FLOWSHEET NOTE
The [address] [place] [address],[facility] [address], [address], [address]  Phone: [phone]  Fax [phone]    Physical Therapy  Cancellation/No-show Note  Patient Name:  Domenico Huston  :  1995   Date:  2022  Cancelled visits to date: 0  No-shows to date: 3    Patient status for today's appointment patient:  []  Cancelled  []  Rescheduled appointment  [x]  No-show     Reason given by patient:  []  Patient ill  []  Conflicting appointment  []  No transportation    []  Conflict with work  [x]  No reason given  []  Other:     Comments:      Phone call information:   []  Phone call made today to patient at    time at number provided:      []  Patient answered, conversation as follows:    []  Patient did not answer, message left as follows: about missed appointment and to call or stop by to reschedule  []  Phone call not made today    Electronically signed by:  Celestina Suero, PT  Flo Matamoros, SPT Therapist was present, directed the patient's care, made skilled judgement, and was responsible for assessment and treatment of the patient.